# Patient Record
Sex: FEMALE | Race: BLACK OR AFRICAN AMERICAN | NOT HISPANIC OR LATINO | URBAN - METROPOLITAN AREA
[De-identification: names, ages, dates, MRNs, and addresses within clinical notes are randomized per-mention and may not be internally consistent; named-entity substitution may affect disease eponyms.]

---

## 2018-05-01 ENCOUNTER — EMERGENCY (EMERGENCY)
Facility: HOSPITAL | Age: 33
LOS: 1 days | Discharge: HOME | End: 2018-05-01
Attending: EMERGENCY MEDICINE | Admitting: EMERGENCY MEDICINE
Payer: COMMERCIAL

## 2018-05-01 VITALS
OXYGEN SATURATION: 97 % | TEMPERATURE: 98 F | WEIGHT: 227.08 LBS | DIASTOLIC BLOOD PRESSURE: 82 MMHG | SYSTOLIC BLOOD PRESSURE: 130 MMHG | HEIGHT: 67 IN | RESPIRATION RATE: 18 BRPM | HEART RATE: 85 BPM

## 2018-05-01 VITALS
DIASTOLIC BLOOD PRESSURE: 94 MMHG | OXYGEN SATURATION: 100 % | RESPIRATION RATE: 16 BRPM | HEART RATE: 89 BPM | TEMPERATURE: 99 F | SYSTOLIC BLOOD PRESSURE: 134 MMHG

## 2018-05-01 LAB
APPEARANCE UR: CLEAR — SIGNIFICANT CHANGE UP
BILIRUB UR-MCNC: NEGATIVE — SIGNIFICANT CHANGE UP
COLOR SPEC: YELLOW — SIGNIFICANT CHANGE UP
DIFF PNL FLD: ABNORMAL
EPI CELLS # UR: SIGNIFICANT CHANGE UP
GLUCOSE UR QL: NEGATIVE MG/DL — SIGNIFICANT CHANGE UP
HCG UR QL: NEGATIVE — SIGNIFICANT CHANGE UP
KETONES UR-MCNC: NEGATIVE — SIGNIFICANT CHANGE UP
LEUKOCYTE ESTERASE UR-ACNC: NEGATIVE — SIGNIFICANT CHANGE UP
NITRITE UR-MCNC: NEGATIVE — SIGNIFICANT CHANGE UP
PH UR: 5 — SIGNIFICANT CHANGE UP (ref 5–8)
PROT UR-MCNC: 15 MG/DL
RBC CASTS # UR COMP ASSIST: SIGNIFICANT CHANGE UP /HPF (ref 0–4)
SP GR SPEC: 1.02 — SIGNIFICANT CHANGE UP (ref 1.01–1.02)
UROBILINOGEN FLD QL: NEGATIVE MG/DL — SIGNIFICANT CHANGE UP
WBC UR QL: SIGNIFICANT CHANGE UP

## 2018-05-01 PROCEDURE — 99285 EMERGENCY DEPT VISIT HI MDM: CPT

## 2018-05-01 PROCEDURE — 74176 CT ABD & PELVIS W/O CONTRAST: CPT | Mod: 26

## 2018-05-01 RX ORDER — ACETAMINOPHEN 500 MG
650 TABLET ORAL ONCE
Refills: 0 | Status: COMPLETED | OUTPATIENT
Start: 2018-05-01 | End: 2018-05-01

## 2018-05-01 RX ORDER — IBUPROFEN 200 MG
600 TABLET ORAL ONCE
Refills: 0 | Status: COMPLETED | OUTPATIENT
Start: 2018-05-01 | End: 2018-05-01

## 2018-05-01 RX ORDER — METHOCARBAMOL 500 MG/1
2 TABLET, FILM COATED ORAL
Qty: 30 | Refills: 0
Start: 2018-05-01 | End: 2018-05-05

## 2018-05-01 RX ORDER — METHOCARBAMOL 500 MG/1
750 TABLET, FILM COATED ORAL ONCE
Refills: 0 | Status: COMPLETED | OUTPATIENT
Start: 2018-05-01 | End: 2018-05-01

## 2018-05-01 RX ADMIN — Medication 600 MILLIGRAM(S): at 09:48

## 2018-05-01 RX ADMIN — Medication 650 MILLIGRAM(S): at 09:48

## 2018-05-01 RX ADMIN — METHOCARBAMOL 750 MILLIGRAM(S): 500 TABLET, FILM COATED ORAL at 09:49

## 2018-05-01 NOTE — ED PROVIDER NOTE - OBJECTIVE STATEMENT
Dr. Odom Note: 32F with R flank pain for 1.5wks, no precipitating factors, worse with any movement of the area, non-pleuritic, no assoc urinary, GI, or respiratory sxs.  Constant, not relieved with Motrin.

## 2018-05-01 NOTE — ED ADULT NURSE REASSESSMENT NOTE - NS ED NURSE REASSESS COMMENT FT1
pt feels a little better pt re evaluated by md and to be d'c/d  pt discharged stable and ambulatory in nad at present d/c instruction reinforced and pt verbalized understanding vital signs as charted  pt upset because off today and tomorrow and needs note for Thursady and md explained that if not feeling better on Thursday needs to see pmd for re evaluation

## 2018-05-01 NOTE — ED PROVIDER NOTE - NS HIV RISK FACTOR YES
Declined Bilateral Helical Rim Advancement Flap Text: The defect edges were debeveled with a #15 blade scalpel.  Given the location of the defect and the proximity to free margins (helical rim) a bilateral helical rim advancement flap was deemed most appropriate.  Using a sterile surgical marker, the appropriate advancement flaps were drawn incorporating the defect and placing the expected incisions between the helical rim and antihelix where possible.  The area thus outlined was incised through and through with a #15 scalpel blade.  With a skin hook and iris scissors, the flaps were gently and sharply undermined and freed up.

## 2018-05-01 NOTE — ED PROVIDER NOTE - MEDICAL DECISION MAKING DETAILS
Dr. Odom Note: likely MSK, pt poor historian, consider other causes, not relieved with motrin, possible KS.

## 2018-05-01 NOTE — ED ADULT NURSE NOTE - CHPI ED SYMPTOMS NEG
no anorexia/no bladder dysfunction/no bowel dysfunction/no constipation/no fatigue/no neck tenderness/no numbness/no tingling/no difficulty bearing weight/no motor function loss

## 2018-05-01 NOTE — ED PROVIDER NOTE - PROGRESS NOTE DETAILS
Dr. Odom Note: slightly improved, reviewed ct results, explained need to f/u pcp, take laxatives, and return or see pcp within 2 days for re-eval of pain.  Pt demonstrates poor understanding, repeated and explained to patient few times and repeat back to demonstrate understanding, stable for dc, copy of results to pt. Dr. Odom Note: pt continues to want a work note for Thursday and Friday even though I explained to patient that she should f/u pcp and seek medical attention if pain persists beyond tomorrow.  Pt continue to ask for work note, want to force patient to get re-evaluation if pain is bad enough to miss work in 2 days.

## 2019-12-21 ENCOUNTER — EMERGENCY (EMERGENCY)
Facility: HOSPITAL | Age: 34
LOS: 1 days | Discharge: HOME | End: 2019-12-21
Attending: EMERGENCY MEDICINE | Admitting: EMERGENCY MEDICINE
Payer: COMMERCIAL

## 2019-12-21 VITALS
RESPIRATION RATE: 16 BRPM | TEMPERATURE: 98 F | SYSTOLIC BLOOD PRESSURE: 139 MMHG | DIASTOLIC BLOOD PRESSURE: 93 MMHG | OXYGEN SATURATION: 100 % | HEART RATE: 74 BPM

## 2019-12-21 VITALS
HEIGHT: 67 IN | DIASTOLIC BLOOD PRESSURE: 90 MMHG | HEART RATE: 97 BPM | RESPIRATION RATE: 15 BRPM | WEIGHT: 229.06 LBS | SYSTOLIC BLOOD PRESSURE: 137 MMHG | OXYGEN SATURATION: 100 % | TEMPERATURE: 98 F

## 2019-12-21 PROCEDURE — 99283 EMERGENCY DEPT VISIT LOW MDM: CPT

## 2019-12-21 PROCEDURE — 71046 X-RAY EXAM CHEST 2 VIEWS: CPT | Mod: 26

## 2019-12-21 RX ORDER — LIDOCAINE 4 G/100G
1 CREAM TOPICAL ONCE
Refills: 0 | Status: COMPLETED | OUTPATIENT
Start: 2019-12-21 | End: 2019-12-21

## 2019-12-21 RX ORDER — LIDOCAINE 4 G/100G
1 CREAM TOPICAL
Qty: 7 | Refills: 0
Start: 2019-12-21 | End: 2019-12-27

## 2019-12-21 RX ORDER — KETOROLAC TROMETHAMINE 30 MG/ML
30 SYRINGE (ML) INJECTION ONCE
Refills: 0 | Status: DISCONTINUED | OUTPATIENT
Start: 2019-12-21 | End: 2019-12-21

## 2019-12-21 RX ORDER — CYCLOBENZAPRINE HYDROCHLORIDE 10 MG/1
1 TABLET, FILM COATED ORAL
Qty: 20 | Refills: 0
Start: 2019-12-21 | End: 2019-12-27

## 2019-12-21 RX ORDER — CYCLOBENZAPRINE HYDROCHLORIDE 10 MG/1
10 TABLET, FILM COATED ORAL ONCE
Refills: 0 | Status: COMPLETED | OUTPATIENT
Start: 2019-12-21 | End: 2019-12-21

## 2019-12-21 RX ADMIN — Medication 30 MILLIGRAM(S): at 14:51

## 2019-12-21 RX ADMIN — CYCLOBENZAPRINE HYDROCHLORIDE 10 MILLIGRAM(S): 10 TABLET, FILM COATED ORAL at 14:51

## 2019-12-21 RX ADMIN — LIDOCAINE 1 PATCH: 4 CREAM TOPICAL at 14:50

## 2019-12-21 NOTE — ED PROVIDER NOTE - CLINICAL SUMMARY MEDICAL DECISION MAKING FREE TEXT BOX
left thoracic back pain since yesterday. no pain at rest, worse with movement. suspect muscular in nature. x-ray to eval for fx, pneumo, pneumonia. no red flags. no radicular symptoms. do not suspect renal colic or pyelonephritis. tx with toradol, flexeril, lidocaine patch, ortho referral

## 2019-12-21 NOTE — ED ADULT TRIAGE NOTE - CHIEF COMPLAINT QUOTE
patient has c/o back pain since yesterday, working in the hospital as CNA, denies any trauma on back. pain is positional,

## 2019-12-21 NOTE — ED PROVIDER NOTE - PATIENT PORTAL LINK FT
You can access the FollowMyHealth Patient Portal offered by [facility] by registering at the following website: [email or web address]. By joining [facility]’s FollowMyHealth portal, you will also be able to view your health information using other applications (apps) compatible with our system.

## 2019-12-21 NOTE — ED PROVIDER NOTE - NEUROLOGICAL, MLM
Alert and oriented, no focal deficits, no motor or sensory deficits. full ROM of all ext. neg SLR. no numbness in groin. no toe or foot drop

## 2019-12-21 NOTE — ED PROVIDER NOTE - NSFOLLOWUPINSTRUCTIONS_ED_ALL_ED_FT
rest, ice or moist heat. gentle stretching and gentle massage. naprosyn twice a day with food. flexeril for muscle spasm. lidocaine patches to area (12 hours on 12 hours off). if insurance does not cover patches, buy Salan[employer] lidocaine patches over the counter. follow up with orthopedics if pain continues. referral provided       Thoracic Strain     Thoracic strain is an injury to the muscles or tendons that attach to the upper back. A strain can be mild or severe. A mild strain may take only 1–2 weeks to heal. A severe strain involves torn muscles or tendons, so it may take 6–8 weeks to heal.  Follow these instructions at home:  Rest as needed. Limit your activity as told by your doctor.If directed, put ice on the injured area:  Put ice in a plastic bag.Place a towel between your skin and the bag.Leave the ice on for 20 minutes, 2–3 times per day.Take over-the-counter and prescription medicines only as told by your doctor.Begin doing exercises as told by your doctor or physical therapist.Warm up before being active.Bend your knees before you lift heavy objects.Keep all follow-up visits as told by your doctor. This is important.Contact a doctor if:  Your pain is not helped by medicine.Your pain, bruising, or swelling is getting worse.You have a fever.Get help right away if:  You have shortness of breath.You have chest pain.You have weakness or loss of feeling (numbness) in your legs.You cannot control when you pee (urinate).This information is not intended to replace advice given to you by your health care provider. Make sure you discuss any questions you have with your health care provider.

## 2019-12-21 NOTE — ED PROVIDER NOTE - MUSCULOSKELETAL, MLM
no tenderness reproduced to palpation. no vert tenderness or step off deformities. left thoracic back pain with flexion and rotation

## 2019-12-21 NOTE — ED PROVIDER NOTE - CARE PROVIDER_API CALL
Julio Cesar Fitzgerald)  Orthopaedic Surgery  [address] [place] [address]  Phone: [phone]  Fax: [phone]  Follow Up Time:     Nika Plaza)  Internal Medicine  [address] [place] [address]  Phone: [phone]  Fax: [phone]  Follow Up Time:

## 2019-12-21 NOTE — ED PROVIDER NOTE - OBJECTIVE STATEMENT
34 year old female with history of HTN presents with left thoracic back pain since yesterday morning. noticed it when she woke up yesterday morning, has pain with movement. no pain at rest, increases with movement. no chest pain or abd pain. no n/v. no radicular pain. no loss of bowel or bladder control. no weakness in ext. took advil last night without much improvement of pain. no urinary symptoms   no current PCP

## 2019-12-21 NOTE — ED PROVIDER NOTE - PROGRESS NOTE DETAILS
Trevor Benavides for attending Dr. Nguyen: 35 y/o female with a PMHx of htn and pre-eclampsia, presents to the ED c/o left upper back pain occasionally radiating to abd. Yesterday, woke up and suddenly felt sx upon moving in bed. Denies fevers, chills, cough, chest pain, SOB, weakness, abd pain, spasm, or numbness. Sx is exacerbated with movement. Taken Motrin with no relief. Nonsmoker. Does not have a PCP. No other complaints at this time. Physical exam: Well developed, well nourished, and in no acute distress. Moist mucous membranes. S1, S2 Regular rate and rhythm. Lungs CTA. Abd nontender, nondistended. No CVAT. Neck and back has no midline tenderness. No midline tenderness. Left upper back is nontender, but +pain with passive ROM. Extremities non-tender full ROM. I Rachael Beanvides attest that this documentation has been prepared under the direction and in the presence of Doctor Nguyen. Trevor Benavides for attending Dr. Nguyen: 33 y/o female with a PMHx of htn and pre-eclampsia, presents to the ED c/o left upper back pain occasionally radiating to abd. Yesterday, woke up and suddenly felt sx upon moving/twisting in bed. Denies fevers, chills, cough, chest pain, SOB, weakness, weakness or numbness, incontinence, urinary comaplaints. Sx is exacerbated with movement. Taken Motrin with no relief. Nonsmoker. Does not have a PCP. No other complaints at this time. Physical exam: Well developed, well nourished, and in no acute distress. Moist mucous membranes. S1, S2 Regular rate and rhythm. Lungs CTA. Abd nontender, nondistended. No CVAT. Neck and back has no midline tenderness. No midline tenderness. Left upper back is nontender, but +reproducible pain with passive ROM. Extremities non-tender full ROM, distal n/v intact, no foot drop Reevaluated patient at bedside.  Patient feeling improved.  ambulatory with steady gait. soft tissue instructions explained. stretches explained and demonstrated. referral to ortho provided.   Discussed the results of all diagnostic testing in ED and copies of all reports given.   An opportunity to ask questions was given.  Discussed the importance of prompt, close medical follow-up.  Patient will return with any changes, concerns or persistent / worsening symptoms.  Understanding of all instructions verbalized.

## 2019-12-21 NOTE — ED ADULT NURSE NOTE - OBJECTIVE STATEMENT
patient has been holding heavy bag for her study and c/o back pain since yesterday, pain is positional and goes up to 10/10, motrin didn't help her last night, denies trauma, no difficulty breathing, pending MD's eval, no PMH, will continue to monitor.

## 2020-03-31 PROBLEM — Z00.00 ENCOUNTER FOR PREVENTIVE HEALTH EXAMINATION: Noted: 2020-03-31

## 2020-11-10 NOTE — ED ADULT TRIAGE NOTE - STATUS:
Emergency Department Resident Note    Patient: Anju Pugh Age: 69 year old Sex: female   MRN: [id number] : 1951 Encounter Date: 2020       HISTORY OF PRESENT ILLNESS  This is a 69 year old female previous TIA presenting for evaluation of disequilibrium worsening over the past month.  She states intermittently she feels as if she is losing her balance and has to grab the wall to help her walk.  She states that these episodes come randomly but have worsened over the past month.  She talked to her doctor about this who prompted her to come in for TIA work-up.  She denies any focal numbness or tingling, focal weakness, headaches, blurry visions.  She states when she had her previous TIA she had aphasia which resolved.  She denies any recent falls.  She denies any fevers, dysuria.    REVIEW OF SYSTEMS  Review of Systems   Constitutional: Negative for fever.   HENT: Negative for congestion.    Respiratory: Negative for shortness of breath.    Cardiovascular: Negative for chest pain.   Gastrointestinal: Negative for abdominal pain, nausea and vomiting.   Endocrine: Negative for polyuria.   Genitourinary: Negative for dysuria.   Musculoskeletal: Negative for myalgias.   Skin: Negative for rash.   Neurological: Positive for dizziness.   Hematological: Does not bruise/bleed easily.   Psychiatric/Behavioral: Negative for suicidal ideas.               PAST HISTORY         Past Medical History:   Diagnosis Date   • TIA (transient ischemic attack)     Past Surgical History:   Procedure Laterality Date   • HYSTERECTOMY               Social History     Tobacco Use   • Smoking status: Former Smoker     Packs/day: 1.50     Years: 30.00     Pack years: 45.00   • Smokeless tobacco: Never Used   Substance Use Topics   • Alcohol use: Not Currently   • Drug use: Not Currently    Family History   Problem Relation Age of Onset   • Patient is unaware of any medical problems Mother    • Patient is unaware of any medical  problems Father              Prior to Admission medications    Medication Sig Start Date End Date Taking? Authorizing Provider   clobetasol (TEMOVATE) 0.05 % ointment Apply topically every 12 hours. 9/10/20   Arvind Evans MD   metroNIDAZOLE (METROCREAM) 0.75 % cream Apply on skin, twice daily as needed for rosacea, 9/10/20   Arvind Evans MD   atorvastatin (LIPITOR) 20 MG tablet Take 1 tablet by mouth daily. 9/10/20   Arvind Evans MD   aspirin 81 MG EC tablet Take 1 tablet by mouth daily. 12/5/19   Arvind Evans MD    Allergies   Allergen Reactions   • Orange RASH           Additional Information:     PHYSICAL EXAMINATION  ED Triage Vitals [11/09/20 2035]   ED Triage Vitals Group      Temp 96.5 °F (35.8 °C)      Heart Rate 76      Resp 18      /81      SpO2 98 %      EtCO2 mmHg       Height       Weight 155 lb 6.8 oz (70.5 kg)      Weight Scale Used       BMI (Calculated)       IBW/kg (Calculated)      Physical Exam  Vitals signs and nursing note reviewed.   Constitutional:       Appearance: She is not toxic-appearing.   HENT:      Head: Normocephalic and atraumatic.      Mouth/Throat:      Mouth: Mucous membranes are moist.   Eyes:      Extraocular Movements: Extraocular movements intact.   Neck:      Musculoskeletal: Neck supple.   Cardiovascular:      Rate and Rhythm: Regular rhythm. Bradycardia present.      Heart sounds: Normal heart sounds.   Pulmonary:      Effort: Pulmonary effort is normal.      Breath sounds: Normal breath sounds.   Abdominal:      General: Abdomen is flat. There is no distension.      Palpations: Abdomen is soft.   Musculoskeletal: Normal range of motion.   Skin:     General: Skin is warm and dry.   Neurological:      General: No focal deficit present.      Mental Status: She is alert. Mental status is at baseline.      GCS: GCS eye subscore is 4. GCS verbal subscore is 5. GCS motor subscore is 6.      Cranial Nerves: No cranial nerve deficit.      Sensory: No sensory  deficit.      Coordination: Coordination normal.   Psychiatric:         Mood and Affect: Mood normal.         Labs:   Results for orders placed or performed during the hospital encounter of 11/10/20   CBC with Automated Differential   Result Value Ref Range    WBC 9.8 4.2 - 11.0 K/mcL    RBC 4.66 4.00 - 5.20 mil/mcL    HGB 13.2 12.0 - 15.5 g/dL    HCT 41.1 36.0 - 46.5 %    MCV 88.2 78.0 - 100.0 fl    MCH 28.3 26.0 - 34.0 pg    MCHC 32.1 32.0 - 36.5 g/dL    RDW-CV 13.4 11.0 - 15.0 %     140 - 450 K/mcL    NRBC 0 0 /100 WBC    DIFF TYPE AUTOMATED DIFFERENTIAL     Neutrophil 63 %    LYMPH 26 %    MONO 7 %    EOSIN 3 %    BASO 1 %    Percent Immature Granuloctyes 0 %    Absolute Neutrophil 6.1 1.8 - 7.7 K/mcL    Absolute Lymph 2.6 1.0 - 4.0 K/mcL    Absolute Mono 0.7 0.3 - 0.9 K/mcL    Absolute Eos 0.3 0.1 - 0.5 K/mcL    Absolute Baso 0.1 0.0 - 0.3 K/mcL    Absolute Immature Granulocytes 0.0 0 - 0.2 K/mcl   Comprehensive Metabolic Panel   Result Value Ref Range    Sodium 141 135 - 145 mmol/L    Potassium 4.2 3.4 - 5.1 mmol/L    Chloride 110 (H) 98 - 107 mmol/L    Carbon Dioxide 26 21 - 32 mmol/L    Anion Gap 9 (L) 10 - 20 mmol/L    Glucose 93 65 - 99 mg/dL    BUN 29 (H) 6 - 20 mg/dL    Creatinine 0.91 0.51 - 0.95 mg/dL    GFR Estimate,  75     GFR Estimate, Non African American 65     BUN/Creatinine Ratio 32 (H) 7 - 25    CALCIUM 10.0 8.4 - 10.2 mg/dL    TOTAL BILIRUBIN 0.3 0.2 - 1.0 mg/dL    AST/SGOT 14 <38 Units/L    ALT/SGPT 21 <64 Units/L    ALK PHOSPHATASE 82 45 - 117 Units/L    TOTAL PROTEIN 8.0 6.4 - 8.2 g/dL    Albumin 3.7 3.6 - 5.1 g/dL    GLOBULIN 4.3 (H) 2.0 - 4.0 g/dL    A/G Ratio, Serum 0.9 (L) 1.0 - 2.4   Troponin I Ultra Sensitive   Result Value Ref Range    TROPONIN I <0.02 <0.05 ng/mL   Rapid SARS-CoV-2 by PCR    Specimen: Nasopharyngeal; Swab   Result Value Ref Range    Source, 2019 Novel Coronavirus (SARS-CoV-2) Nasopharyngeal     Rapid SARS-COV-2 by PCR NOT DETECTED NOT  DETECTED    Isolation Guidelines         Imaging:   CT HEAD WO CONTRAST   Final Result   No acute intracranial abnormality.       Electronically Signed by: JAD RAMOS M.D.   Signed on: 11/10/2020 05:12 AM      XR CHEST PA AND LATERAL 2 VIEWS   Final Result   No radiographic evidence of acute cardiopulmonary process.       Electronically Signed by: WILEY NEWMAN MD    Signed on: 11/9/2020 9:28 PM            EKG: EKG reviewed and showed Normal sinus rhythm, no ST segment elevations or depressions, T wave flattening in aVL      MEDICAL DECISION MAKING  This is a 69 year old female with history of TIA presenting for evaluation of intermittent disequilibrium over the past month.  On exam, patient is nontoxic-appearing, hemodynamically stable, no focal neurologic deficits noted.  Patient's presentation concerning for TIAs.  She also does appear bradycardic intermittently on her exam and she could be going into abnormal rhythms.  Her labs are unremarkable here.  CT head unremarkable.  We will give her aspirin for possible TIA.  Patient will be placed in observation for further TIA work-up.  Patient is agreeable to this plan.      IMPRESSION AND PLAN  ED Diagnosis   1. Disequilibrium       Disposition: Observation    Hermila Tinajero  Emergency Medicine, PGY3  [employer]:          Hermila Tinajero  Resident  11/10/20 0863     [employer]

## 2021-01-29 ENCOUNTER — EMERGENCY (EMERGENCY)
Facility: HOSPITAL | Age: 36
LOS: 1 days | Discharge: HOME | End: 2021-01-29
Attending: EMERGENCY MEDICINE | Admitting: EMERGENCY MEDICINE
Payer: COMMERCIAL

## 2021-01-29 VITALS
OXYGEN SATURATION: 100 % | DIASTOLIC BLOOD PRESSURE: 93 MMHG | HEART RATE: 70 BPM | TEMPERATURE: 98 F | SYSTOLIC BLOOD PRESSURE: 143 MMHG

## 2021-01-29 VITALS
OXYGEN SATURATION: 100 % | RESPIRATION RATE: 16 BRPM | HEIGHT: 67 IN | SYSTOLIC BLOOD PRESSURE: 144 MMHG | HEART RATE: 76 BPM | TEMPERATURE: 98 F | WEIGHT: 225.09 LBS | DIASTOLIC BLOOD PRESSURE: 94 MMHG

## 2021-01-29 LAB — HCG UR QL: NEGATIVE — SIGNIFICANT CHANGE UP

## 2021-01-29 PROCEDURE — G1004: CPT

## 2021-01-29 PROCEDURE — 70486 CT MAXILLOFACIAL W/O DYE: CPT | Mod: 26

## 2021-01-29 PROCEDURE — 70450 CT HEAD/BRAIN W/O DYE: CPT | Mod: 26

## 2021-01-29 PROCEDURE — 99285 EMERGENCY DEPT VISIT HI MDM: CPT

## 2021-01-29 RX ORDER — OXYCODONE AND ACETAMINOPHEN 5; 325 MG/1; MG/1
1 TABLET ORAL
Qty: 6 | Refills: 0
Start: 2021-01-29

## 2021-01-29 NOTE — ED PROVIDER NOTE - CARE PROVIDER_API CALL
Corie Hanson (DO)  Internal Medicine  [address] [place] [address]  Phone: [phone]  Fax: [phone]  Follow Up Time:

## 2021-01-29 NOTE — ED ADULT NURSE NOTE - NSFALLRSKHARMRISK_ED_ALL_ED
Problem: Patient Care Overview  Goal: Plan of Care/Patient Progress Review  OT/PT: Chart reviewed, discussed with nursing, pt continues to not be medically appropriate at this time for skilled therapy. Will reschedule.        no

## 2021-01-29 NOTE — ED PROVIDER NOTE - NSFOLLOWUPINSTRUCTIONS_ED_ALL_ED_FT
1) Follow-up with your Primary Medical Doctor or referred doctor. Call today / next business day for prompt follow-up.  2) Return to Emergency room for any worsening or persistent pain, dizziness, difficulty walking, feeling unsteady, vomiting, visual changes, numbness, tingling, any unknown fluid coming out of nose or ears, any changes in your speech,  worsening or persistent headaches,  weakness, fever, or any other concerning symptoms.  3) See attached instruction sheets for additional information, including information regarding signs and symptoms to look out for, reasons to seek immediate care and other important instructions.  4) You should avoid any activities where you may hit your head until cleared by your doctor, at least two weeks.  5) Follow-up with Neurology, especially if your symptoms persist.  Dr Sugar Reyna: [phone]  [facility]): [phone]  [facility]: [phone]  6) Tylenol as needed for pain

## 2021-01-29 NOTE — ED PROVIDER NOTE - PROGRESS NOTE DETAILS
At length discussion with patient in regards to the head inj.  Discussed importance of prompt, close medical follow-up.  Discussed importance of avoiding activities where the patient would be at risk for further head injury, for a minimum of 2 weeks.  Discussed head injury precautions and instructions as well.  Patient demonstrates understanding of all instructions. Pt req percocet for pain - dw pt re percocet prec/ inst and no driving

## 2021-01-29 NOTE — ED PROVIDER NOTE - PATIENT PORTAL LINK FT
You can access the FollowMyHealth Patient Portal offered by [facility] by registering at the following website: [email or web address]. By joining [employer]’s FollowMyHealth portal, you will also be able to view your health information using other applications (apps) compatible with our system.

## 2021-01-29 NOTE — ED PROVIDER NOTE - OBJECTIVE STATEMENT
34 yo F p/w slipped on stairs and slid down last night. Pt hit R side of face, ? L side as well. pt co facial pain. no loc. Min HA. No n/v/dizzy. No neck / back pain. no ext pain or inj. No laceration / bleeding. no weakness / dizziness. no fever/chills. NO agg/allev factors. No other inj or co. No known covid exposure / prior infxn. no agg/allev factors. No other inj or co.

## 2021-01-29 NOTE — ED PROVIDER NOTE - ENMT, MLM
Airway patent, Nasal mucosa clear. Mouth with normal mucosa. Throat has no vesicles, no oropharyngeal exudates and uvula is midline. pos R maxillary swelling / ecchymosis with mild tend. no crepitus. Min tend to L maxilla as well - no edema / ecchymosis. no dc from ears.

## 2021-08-12 ENCOUNTER — EMERGENCY (EMERGENCY)
Facility: HOSPITAL | Age: 36
LOS: 1 days | Discharge: HOME | End: 2021-08-12
Attending: EMERGENCY MEDICINE | Admitting: EMERGENCY MEDICINE
Payer: COMMERCIAL

## 2021-08-12 VITALS
DIASTOLIC BLOOD PRESSURE: 86 MMHG | HEART RATE: 80 BPM | TEMPERATURE: 99 F | RESPIRATION RATE: 16 BRPM | OXYGEN SATURATION: 100 % | HEIGHT: 67 IN | WEIGHT: 223.11 LBS | SYSTOLIC BLOOD PRESSURE: 135 MMHG

## 2021-08-12 VITALS
DIASTOLIC BLOOD PRESSURE: 88 MMHG | SYSTOLIC BLOOD PRESSURE: 135 MMHG | RESPIRATION RATE: 14 BRPM | TEMPERATURE: 98 F | OXYGEN SATURATION: 98 % | HEART RATE: 78 BPM

## 2021-08-12 LAB
ALBUMIN SERPL ELPH-MCNC: 3.6 G/DL — SIGNIFICANT CHANGE UP (ref 3.3–5)
ALP SERPL-CCNC: 75 U/L — SIGNIFICANT CHANGE UP (ref 30–120)
ALT FLD-CCNC: 21 U/L DA — SIGNIFICANT CHANGE UP (ref 10–60)
ANION GAP SERPL CALC-SCNC: 6 MMOL/L — SIGNIFICANT CHANGE UP (ref 5–17)
AST SERPL-CCNC: 17 U/L — SIGNIFICANT CHANGE UP (ref 10–40)
BASOPHILS # BLD AUTO: 0.03 K/UL — SIGNIFICANT CHANGE UP (ref 0–0.2)
BASOPHILS NFR BLD AUTO: 0.5 % — SIGNIFICANT CHANGE UP (ref 0–2)
BILIRUB SERPL-MCNC: 0.1 MG/DL — LOW (ref 0.2–1.2)
BUN SERPL-MCNC: 9 MG/DL — SIGNIFICANT CHANGE UP (ref 7–23)
CALCIUM SERPL-MCNC: 8.9 MG/DL — SIGNIFICANT CHANGE UP (ref 8.4–10.5)
CHLORIDE SERPL-SCNC: 104 MMOL/L — SIGNIFICANT CHANGE UP (ref 96–108)
CO2 SERPL-SCNC: 27 MMOL/L — SIGNIFICANT CHANGE UP (ref 22–31)
CREAT SERPL-MCNC: 0.51 MG/DL — SIGNIFICANT CHANGE UP (ref 0.5–1.3)
D DIMER BLD IA.RAPID-MCNC: <150 NG/ML DDU — SIGNIFICANT CHANGE UP
EOSINOPHIL # BLD AUTO: 0.13 K/UL — SIGNIFICANT CHANGE UP (ref 0–0.5)
EOSINOPHIL NFR BLD AUTO: 2.2 % — SIGNIFICANT CHANGE UP (ref 0–6)
GLUCOSE SERPL-MCNC: 99 MG/DL — SIGNIFICANT CHANGE UP (ref 70–99)
HCG UR QL: NEGATIVE — SIGNIFICANT CHANGE UP
HCT VFR BLD CALC: 40.5 % — SIGNIFICANT CHANGE UP (ref 34.5–45)
HGB BLD-MCNC: 12.9 G/DL — SIGNIFICANT CHANGE UP (ref 11.5–15.5)
IMM GRANULOCYTES NFR BLD AUTO: 0.3 % — SIGNIFICANT CHANGE UP (ref 0–1.5)
LYMPHOCYTES # BLD AUTO: 2.38 K/UL — SIGNIFICANT CHANGE UP (ref 1–3.3)
LYMPHOCYTES # BLD AUTO: 40.1 % — SIGNIFICANT CHANGE UP (ref 13–44)
MAGNESIUM SERPL-MCNC: 2 MG/DL — SIGNIFICANT CHANGE UP (ref 1.6–2.6)
MCHC RBC-ENTMCNC: 25.3 PG — LOW (ref 27–34)
MCHC RBC-ENTMCNC: 31.9 GM/DL — LOW (ref 32–36)
MCV RBC AUTO: 79.4 FL — LOW (ref 80–100)
MONOCYTES # BLD AUTO: 0.59 K/UL — SIGNIFICANT CHANGE UP (ref 0–0.9)
MONOCYTES NFR BLD AUTO: 9.9 % — SIGNIFICANT CHANGE UP (ref 2–14)
NEUTROPHILS # BLD AUTO: 2.78 K/UL — SIGNIFICANT CHANGE UP (ref 1.8–7.4)
NEUTROPHILS NFR BLD AUTO: 47 % — SIGNIFICANT CHANGE UP (ref 43–77)
NRBC # BLD: 0 /100 WBCS — SIGNIFICANT CHANGE UP (ref 0–0)
PHOSPHATE SERPL-MCNC: 3.3 MG/DL — SIGNIFICANT CHANGE UP (ref 2.5–4.5)
PLATELET # BLD AUTO: 320 K/UL — SIGNIFICANT CHANGE UP (ref 150–400)
POTASSIUM SERPL-MCNC: 3.8 MMOL/L — SIGNIFICANT CHANGE UP (ref 3.5–5.3)
POTASSIUM SERPL-SCNC: 3.8 MMOL/L — SIGNIFICANT CHANGE UP (ref 3.5–5.3)
PROT SERPL-MCNC: 7.3 G/DL — SIGNIFICANT CHANGE UP (ref 6–8.3)
RBC # BLD: 5.1 M/UL — SIGNIFICANT CHANGE UP (ref 3.8–5.2)
RBC # FLD: 13.3 % — SIGNIFICANT CHANGE UP (ref 10.3–14.5)
SODIUM SERPL-SCNC: 137 MMOL/L — SIGNIFICANT CHANGE UP (ref 135–145)
WBC # BLD: 5.93 K/UL — SIGNIFICANT CHANGE UP (ref 3.8–10.5)
WBC # FLD AUTO: 5.93 K/UL — SIGNIFICANT CHANGE UP (ref 3.8–10.5)

## 2021-08-12 PROCEDURE — 99285 EMERGENCY DEPT VISIT HI MDM: CPT

## 2021-08-12 PROCEDURE — 70450 CT HEAD/BRAIN W/O DYE: CPT | Mod: 26,MA

## 2021-08-12 RX ORDER — SODIUM CHLORIDE 9 MG/ML
1000 INJECTION INTRAMUSCULAR; INTRAVENOUS; SUBCUTANEOUS ONCE
Refills: 0 | Status: COMPLETED | OUTPATIENT
Start: 2021-08-12 | End: 2021-08-12

## 2021-08-12 RX ADMIN — SODIUM CHLORIDE 1000 MILLILITER(S): 9 INJECTION INTRAMUSCULAR; INTRAVENOUS; SUBCUTANEOUS at 10:30

## 2021-08-12 NOTE — ED PROVIDER NOTE - MUSCULOSKELETAL, MLM
Spine appears normal, range of motion is not limited, no muscle or joint tenderness, calf L NT without swelling, toes warm & mobile, pulses and sensation intact, neg homanns sign, NVI

## 2021-08-12 NOTE — ED ADULT TRIAGE NOTE - CHIEF COMPLAINT QUOTE
" I have a discomfort that starts in my left lower leg, goes to my arm, head and left arm since yesterday "

## 2021-08-12 NOTE — ED ADULT NURSE NOTE - NSIMPLEMENTINTERV_GEN_ALL_ED
Implemented All Universal Safety Interventions:  [place] to call system. Call bell, personal items and telephone within reach. Instruct patient to call for assistance. Room bathroom lighting operational. Non-slip footwear when patient is off stretcher. Physically safe environment: no spills, clutter or unnecessary equipment. Stretcher in lowest position, wheels locked, appropriate side rails in place.

## 2021-08-12 NOTE — ED ADULT NURSE NOTE - OBJECTIVE STATEMENT
pt presented with c/o tingling on left leg and right arm, reports tingling started on Tuesday on left leg and radiated to left side of body to head and to right arm.

## 2021-08-12 NOTE — ED PROVIDER NOTE - NSFOLLOWUPINSTRUCTIONS_ED_ALL_ED_FT
Follow up with your PMD in 1-2 days for re-evaluation, ongoing care and treatment. Rest, drink plenty of fluids. If having worsening of symptoms or other related symptoms, RETURN TO THE ER IMMEDIATELY.     Paresthesia      Paresthesia is a burning or prickling feeling. This feeling can happen in any part of the body. It often happens in the hands, arms, legs, or feet. Usually, it is not painful. In most cases, the feeling goes away in a short time and is not a sign of a serious problem. If you have paresthesia that lasts a long time, you may need to be seen by your doctor.      Follow these instructions at home:      Alcohol use    • Do not drink alcohol if:  •Your doctor tells you not to drink.       •You are pregnant, may be pregnant, or are planning to become pregnant.      •If you drink alcohol: •Limit how much you use to:  •0–1 drink a day for women.      •0–2 drinks a day for men.        •Be aware of how much alcohol is in your drink. In the [place] one drink equals one 12 oz bottle of beer (355 mL), one 5 oz glass of wine (148 mL), or one 1½ oz glass of hard liquor (44 mL).          Nutrition    •Eat a healthy diet. This includes:  •Eating foods that have a lot of fiber in them, such as fresh fruits and vegetables, whole grains, and beans.      •Limiting foods that have a lot of fat and processed sugars in them, such as fried or sweet foods.        General instructions     •Take over-the-counter and prescription medicines only as told by your doctor.      • Do not use any products that have nicotine or tobacco in them, such as cigarettes and e-cigarettes. If you need help quitting, ask your doctor.      •If you have diabetes, work with your doctor to make sure your blood sugar stays in a healthy range.    •If your feet feel numb:  •Check for redness, warmth, and swelling every day.      •Wear padded socks and comfortable shoes. These help protect your feet.        •Keep all follow-up visits as told by your doctor. This is important.        Contact a doctor if:    •You have paresthesia that gets worse or does not go away.      •Your burning or prickling feeling gets worse when you walk.      •You have pain or cramps.      •You feel dizzy.      •You have a rash.        Get help right away if you:    •Feel weak.      •Have trouble walking or moving.      •Have problems speaking, understanding, or seeing.      •Feel confused.      •Cannot control when you pee (urinate) or poop (have a bowel movement).      •Lose feeling (have numbness) after an injury.      •Have new weakness in an arm or leg.      •Pass out (faint).        Summary    •Paresthesia is a burning or prickling feeling. It often happens in the hands, arms, legs, or feet.      •In most cases, the feeling goes away in a short time and is not a sign of a serious problem.      •If you have paresthesia that lasts a long time, you may need to be seen by your doctor.      This information is not intended to replace advice given to you by your health care provider. Make sure you discuss any questions you have with your health care provider.

## 2021-08-12 NOTE — ED PROVIDER NOTE - PATIENT PORTAL LINK FT
You can access the FollowMyHealth Patient Portal offered by [facility] by registering at the following website: [email or web address]. By joining [employer]s FollowMyHealth portal, you will also be able to view your health information using other applications (apps) compatible with our system.

## 2021-08-12 NOTE — ED PROVIDER NOTE - CPE EDP EYES NORM
Martha Rodgers was admitted to  from ED via cart accompanied by ED tech.   Reason for hospitalization is chest pain/palpitations.   Upon arrival, patient is stable. 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 Sneed's esophagus. EGD in 3 years for Sneed's follow-up.   • Blood clot associated with vein wall inflammation    • Chronic headaches    • Depression    • Fibromyalgia    • Interstitial lung disease (CMS/HCC)    • Lumbar disc disease    • RA (rheumatoid arthritis) (CMS/HCC)    • RAD (reactive airway disease)    • s/p VATS right lower lobe and right middle lobe wedge resections 5/2/2016   • Sinusitis, chronic    • Systemic sclerosis (CMS/HCC) 9/22/2014   • Urinary tract infection    • Wrist fracture, bilateral      Past Surgical History:   Procedure Laterality Date   • ACHILLES TENDON SURGERY  2007   • CARPAL TUNNEL RELEASE  2005   • CERVICAL CERCLAGE  1998   • CHOLECYSTECTOMY  10/2006   • ESOPHAGOGASTRODUODENOSCOPY  09/11/2017    Dr Dudley   • LUNG BIOPSY     • SINUS SURGERY     • STOMACH SURGERY      stomach stapling   • TONSILLECTOMY AND ADENOIDECTOMY     • TUBAL LIGATION  1999   Patient oriented to bed, call light, , room and unit.  Patient provided with the following educational materials upon admission:safety, advanced directives, infection control and pain.   Level of understanding patient verbalized understanding.   Admission orders not received at this time.   MD notified of patient arrival.   See Epic documentation for patient individualized nursing care plan.   normal...

## 2021-08-12 NOTE — ED PROVIDER NOTE - PROGRESS NOTE DETAILS
Reevaluated patient at bedside.  Patient feeling much improved.  Discussed the results of all diagnostic testing in ED and copies of all reports given. Advised to monitor BP at home, f/u pcp for re-eval and treatment.  An opportunity to ask questions was given.  Discussed the importance of prompt, close medical follow-up.  Patient will return with any changes, concerns or persistent / worsening symptoms.  Understanding of all instructions verbalized.

## 2021-08-12 NOTE — ED PROVIDER NOTE - CLINICAL SUMMARY MEDICAL DECISION MAKING FREE TEXT BOX
34 y/o F with hx of preeclampsia presents with c/o diffuse paresthesias x 2 days. States that she has had intermittent paresthesias in her left leg, left shoulder and right arm since Tuesday. States that she has not seen her PCP in [place] in 2 years. Pt is not on any antihypertensives. Denies pregnancy smoking, ocp use, recent immobilization, fever, N/V/D, CP, SOB, headache, dizziness, vision changes, weakness, abdominal pain, calf pain/swelling or other symptoms. PE: as above; will check labs, ct head, IVF, ddimer, reassess

## 2021-08-12 NOTE — ED PROVIDER NOTE - ATTENDING CONTRIBUTION TO CARE
35 f with hx of preeclampsia co paraesthesias in left lower leg rt arm and left shoulder for 2 days. Denies fever, headache, weakness, visual disturbance or other symptom. Is worried she may have a blood clot. Denies calf tenderness or swelling. Nonsmoker, not pregnant, no BCP. Had covid infection last year and is unvaccinated.  PCP in [place], has not seen in 2 years.    PE /80, Afebrile, NAD  HEENT Clear, PERRL, EOMI,  Neck supple nontender, Lungs clear, Cor S1S2 RR -MGR  Abd soft nontender, no mass or HSM  Ext FROM, pulses sensation intact. No calf swelling or tenderness, no cords or Homans.   Skin Warm and dry no rash.  Neuro No deficits, CN 2 - 12 intact. Sensation and motor strength 5/5 thruout.  Gait normal.      Imp Paraesthesias  Plan - Labs, CT brain. DDimer.     I performed a history and physical exam of the patient and discussed their management with the advanced care provider. I reviewed the advanced care provider's note and agree with the documented findings and plan of care. My medical decision making and objective findings are found above.

## 2021-08-12 NOTE — ED PROVIDER NOTE - OBJECTIVE STATEMENT
34 y/o F with hx of preeclampsia presents with c/o diffuse paresthesias x 2 days. States that she has had intermittent paresthesias in her left leg, left shoulder and right arm since Tuesday. States that she has not seen her PCP in [place] in 2 years. Pt is not on any antihypertensives. Denies pregnancy smoking, ocp use, recent immobilization, fever, N/V/D, CP, SOB, headache, dizziness, vision changes, weakness, abdominal pain, calf pain/swelling or other symptoms.

## 2021-08-12 NOTE — ED PROVIDER NOTE - CHIEF COMPLAINT
The patient is a 35y Female complaining of pain, lower leg. The patient is a 35y Female complaining of pain.

## 2021-10-27 ENCOUNTER — EMERGENCY (EMERGENCY)
Facility: HOSPITAL | Age: 36
LOS: 1 days | Discharge: HOME | End: 2021-10-27
Attending: EMERGENCY MEDICINE | Admitting: EMERGENCY MEDICINE
Payer: SELF-PAY

## 2021-10-27 VITALS
WEIGHT: 220.46 LBS | HEART RATE: 90 BPM | HEIGHT: 67 IN | DIASTOLIC BLOOD PRESSURE: 81 MMHG | RESPIRATION RATE: 16 BRPM | TEMPERATURE: 99 F | SYSTOLIC BLOOD PRESSURE: 138 MMHG | OXYGEN SATURATION: 99 %

## 2021-10-27 PROCEDURE — 99284 EMERGENCY DEPT VISIT MOD MDM: CPT

## 2021-10-27 NOTE — ED PROVIDER NOTE - PROVIDER TOKENS
FREE:[LAST:[Your doctor],PHONE:[(   )    -],FAX:[(   )    -],FOLLOWUP:[4-6 Days]],PROVIDER:[TOKEN:[[id number]]]

## 2021-10-27 NOTE — ED PROVIDER NOTE - PATIENT PORTAL LINK FT
You can access the FollowMyHealth Patient Portal offered by [facility] by registering at the following website: [email or web address]. By joining [employer]’s FollowMyHealth portal, you will also be able to view your health information using other applications (apps) compatible with our system.

## 2021-10-27 NOTE — ED PROVIDER NOTE - CLINICAL SUMMARY MEDICAL DECISION MAKING FREE TEXT BOX
Patient with elevated BP readings, normal now. Varying neurologic symptoms. Recent normal CT. Will get labs to r/o metabolic issues and refer to neuro for f/u

## 2021-10-27 NOTE — ED PROVIDER NOTE - CARE PLAN
1 Principal Discharge DX:	Paresthesia   Principal Discharge DX:	Paresthesia  Secondary Diagnosis:	Elevated BP without diagnosis of hypertension

## 2021-10-27 NOTE — ED PROVIDER NOTE - OBJECTIVE STATEMENT
Patient states she has had some elevated readings of her BP since she had preeclampsia in 2019. Was here 2 months ago with weird feeling in her left leg and diagnosed with paresthesias. Had CT at that time which was normal Patient states she has had some elevated readings of her BP since she had preeclampsia in 2019. Was here 2 months ago with weird feeling in her left leg and diagnosed with paresthesias. Had CT at that time which was normal. Patient has continued to have some elevated readings. Today, BP was higher than ever before (about 170/106). Patient took a family member's amlodipine in the morning. BOP improved, but patient feels a weird feeling in her right arm, and a h/a. States she gets a lot of clots with her menses (which she has currently) and is concerned that means she is prone to clots

## 2021-10-27 NOTE — ED ADULT NURSE NOTE - OBJECTIVE STATEMENT
Pt presents to the ED with reports of fluctuating BP and a high reading last night. Pt also states that she has a discomfort in her right arm that she states kept her from sleeping and feels "heavy". Pt is awake and alert, denies any chest pain or difficulty breathing. Pt ambulating with a steady gait.

## 2021-10-27 NOTE — ED PROVIDER NOTE - CARE PROVIDER_API CALL
Your doctor,   Phone: (   )    -  Fax: (   )    -  Follow Up Time: 4-6 Days    Jose Luis Almanzar; MBBS)  Internal Medicine  [address] [place] [address]  Phone: [phone]  Fax: [phone]  Follow Up Time:

## 2021-10-28 VITALS
RESPIRATION RATE: 16 BRPM | HEART RATE: 67 BPM | SYSTOLIC BLOOD PRESSURE: 131 MMHG | OXYGEN SATURATION: 100 % | DIASTOLIC BLOOD PRESSURE: 83 MMHG

## 2021-10-28 LAB
ALBUMIN SERPL ELPH-MCNC: 3.5 G/DL — SIGNIFICANT CHANGE UP (ref 3.3–5)
ALP SERPL-CCNC: 81 U/L — SIGNIFICANT CHANGE UP (ref 30–120)
ALT FLD-CCNC: 27 U/L DA — SIGNIFICANT CHANGE UP (ref 10–60)
ANION GAP SERPL CALC-SCNC: 8 MMOL/L — SIGNIFICANT CHANGE UP (ref 5–17)
APPEARANCE UR: CLEAR — SIGNIFICANT CHANGE UP
AST SERPL-CCNC: 15 U/L — SIGNIFICANT CHANGE UP (ref 10–40)
BACTERIA # UR AUTO: ABNORMAL
BASOPHILS # BLD AUTO: 0.03 K/UL — SIGNIFICANT CHANGE UP (ref 0–0.2)
BASOPHILS NFR BLD AUTO: 0.3 % — SIGNIFICANT CHANGE UP (ref 0–2)
BILIRUB SERPL-MCNC: 0.1 MG/DL — LOW (ref 0.2–1.2)
BILIRUB UR-MCNC: NEGATIVE — SIGNIFICANT CHANGE UP
BUN SERPL-MCNC: 13 MG/DL — SIGNIFICANT CHANGE UP (ref 7–23)
CALCIUM SERPL-MCNC: 8.7 MG/DL — SIGNIFICANT CHANGE UP (ref 8.4–10.5)
CHLORIDE SERPL-SCNC: 101 MMOL/L — SIGNIFICANT CHANGE UP (ref 96–108)
CO2 SERPL-SCNC: 27 MMOL/L — SIGNIFICANT CHANGE UP (ref 22–31)
COLOR SPEC: YELLOW — SIGNIFICANT CHANGE UP
COMMENT - URINE: SIGNIFICANT CHANGE UP
CREAT SERPL-MCNC: 0.53 MG/DL — SIGNIFICANT CHANGE UP (ref 0.5–1.3)
DIFF PNL FLD: ABNORMAL
EOSINOPHIL # BLD AUTO: 0.14 K/UL — SIGNIFICANT CHANGE UP (ref 0–0.5)
EOSINOPHIL NFR BLD AUTO: 1.6 % — SIGNIFICANT CHANGE UP (ref 0–6)
EPI CELLS # UR: SIGNIFICANT CHANGE UP
GLUCOSE SERPL-MCNC: 98 MG/DL — SIGNIFICANT CHANGE UP (ref 70–99)
GLUCOSE UR QL: NEGATIVE MG/DL — SIGNIFICANT CHANGE UP
HCG UR QL: NEGATIVE — SIGNIFICANT CHANGE UP
HCT VFR BLD CALC: 40.3 % — SIGNIFICANT CHANGE UP (ref 34.5–45)
HGB BLD-MCNC: 12.4 G/DL — SIGNIFICANT CHANGE UP (ref 11.5–15.5)
IMM GRANULOCYTES NFR BLD AUTO: 0.1 % — SIGNIFICANT CHANGE UP (ref 0–1.5)
KETONES UR-MCNC: NEGATIVE — SIGNIFICANT CHANGE UP
LEUKOCYTE ESTERASE UR-ACNC: NEGATIVE — SIGNIFICANT CHANGE UP
LYMPHOCYTES # BLD AUTO: 3.05 K/UL — SIGNIFICANT CHANGE UP (ref 1–3.3)
LYMPHOCYTES # BLD AUTO: 34.4 % — SIGNIFICANT CHANGE UP (ref 13–44)
MAGNESIUM SERPL-MCNC: 2 MG/DL — SIGNIFICANT CHANGE UP (ref 1.6–2.6)
MCHC RBC-ENTMCNC: 24.6 PG — LOW (ref 27–34)
MCHC RBC-ENTMCNC: 30.8 GM/DL — LOW (ref 32–36)
MCV RBC AUTO: 80 FL — SIGNIFICANT CHANGE UP (ref 80–100)
MONOCYTES # BLD AUTO: 0.72 K/UL — SIGNIFICANT CHANGE UP (ref 0–0.9)
MONOCYTES NFR BLD AUTO: 8.1 % — SIGNIFICANT CHANGE UP (ref 2–14)
NEUTROPHILS # BLD AUTO: 4.91 K/UL — SIGNIFICANT CHANGE UP (ref 1.8–7.4)
NEUTROPHILS NFR BLD AUTO: 55.5 % — SIGNIFICANT CHANGE UP (ref 43–77)
NITRITE UR-MCNC: NEGATIVE — SIGNIFICANT CHANGE UP
NRBC # BLD: 0 /100 WBCS — SIGNIFICANT CHANGE UP (ref 0–0)
PH UR: 7 — SIGNIFICANT CHANGE UP (ref 5–8)
PLATELET # BLD AUTO: 342 K/UL — SIGNIFICANT CHANGE UP (ref 150–400)
POTASSIUM SERPL-MCNC: 3.8 MMOL/L — SIGNIFICANT CHANGE UP (ref 3.5–5.3)
POTASSIUM SERPL-SCNC: 3.8 MMOL/L — SIGNIFICANT CHANGE UP (ref 3.5–5.3)
PROT SERPL-MCNC: 7.7 G/DL — SIGNIFICANT CHANGE UP (ref 6–8.3)
PROT UR-MCNC: NEGATIVE MG/DL — SIGNIFICANT CHANGE UP
RBC # BLD: 5.04 M/UL — SIGNIFICANT CHANGE UP (ref 3.8–5.2)
RBC # FLD: 13.7 % — SIGNIFICANT CHANGE UP (ref 10.3–14.5)
RBC CASTS # UR COMP ASSIST: >50 /HPF (ref 0–4)
SODIUM SERPL-SCNC: 136 MMOL/L — SIGNIFICANT CHANGE UP (ref 135–145)
SP GR SPEC: 1.01 — SIGNIFICANT CHANGE UP (ref 1.01–1.02)
TSH SERPL-MCNC: 1.45 UIU/ML — SIGNIFICANT CHANGE UP (ref 0.27–4.2)
UROBILINOGEN FLD QL: NEGATIVE MG/DL — SIGNIFICANT CHANGE UP
WBC # BLD: 8.86 K/UL — SIGNIFICANT CHANGE UP (ref 3.8–10.5)
WBC # FLD AUTO: 8.86 K/UL — SIGNIFICANT CHANGE UP (ref 3.8–10.5)
WBC UR QL: SIGNIFICANT CHANGE UP

## 2021-10-28 PROCEDURE — 85025 COMPLETE CBC W/AUTO DIFF WBC: CPT

## 2021-10-28 PROCEDURE — 81025 URINE PREGNANCY TEST: CPT

## 2021-10-28 PROCEDURE — 99283 EMERGENCY DEPT VISIT LOW MDM: CPT

## 2021-10-28 PROCEDURE — 36415 COLL VENOUS BLD VENIPUNCTURE: CPT

## 2021-10-28 PROCEDURE — 80053 COMPREHEN METABOLIC PANEL: CPT

## 2021-10-28 PROCEDURE — 83735 ASSAY OF MAGNESIUM: CPT

## 2021-10-28 PROCEDURE — 81001 URINALYSIS AUTO W/SCOPE: CPT

## 2021-10-28 PROCEDURE — 84443 ASSAY THYROID STIM HORMONE: CPT

## 2021-10-28 RX ORDER — ACETAMINOPHEN 500 MG
500 TABLET ORAL ONCE
Refills: 0 | Status: COMPLETED | OUTPATIENT
Start: 2021-10-28 | End: 2021-10-28

## 2021-10-28 RX ADMIN — Medication 500 MILLIGRAM(S): at 01:09

## 2021-10-28 RX ADMIN — Medication 500 MILLIGRAM(S): at 01:11
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
## 2022-05-24 PROBLEM — I10 ESSENTIAL (PRIMARY) HYPERTENSION: Chronic | Status: ACTIVE | Noted: 2019-12-21

## 2022-05-24 PROBLEM — O15.00 ECLAMPSIA COMPLICATING PREGNANCY, UNSPECIFIED TRIMESTER: Chronic | Status: ACTIVE | Noted: 2021-10-28

## 2022-05-24 PROBLEM — O14.90 UNSPECIFIED PRE-ECLAMPSIA, UNSPECIFIED TRIMESTER: Chronic | Status: ACTIVE | Noted: 2019-12-21

## 2022-05-26 ENCOUNTER — APPOINTMENT (OUTPATIENT)
Dept: INTERNAL MEDICINE | Facility: CLINIC | Age: 37
End: 2022-05-26
Payer: COMMERCIAL

## 2022-05-26 ENCOUNTER — NON-APPOINTMENT (OUTPATIENT)
Age: 37
End: 2022-05-26

## 2022-05-26 ENCOUNTER — LABORATORY RESULT (OUTPATIENT)
Age: 37
End: 2022-05-26

## 2022-05-26 VITALS
RESPIRATION RATE: 14 BRPM | TEMPERATURE: 97.2 F | HEART RATE: 88 BPM | HEIGHT: 67 IN | OXYGEN SATURATION: 98 % | BODY MASS INDEX: 36.73 KG/M2 | WEIGHT: 234 LBS | SYSTOLIC BLOOD PRESSURE: 138 MMHG | DIASTOLIC BLOOD PRESSURE: 88 MMHG

## 2022-05-26 DIAGNOSIS — O14.90 UNSPECIFIED PRE-ECLAMPSIA, UNSPECIFIED TRIMESTER: ICD-10-CM

## 2022-05-26 DIAGNOSIS — Z00.00 ENCOUNTER FOR GENERAL ADULT MEDICAL EXAMINATION W/OUT ABNORMAL FINDINGS: ICD-10-CM

## 2022-05-26 DIAGNOSIS — Z83.438 FAMILY HISTORY OF OTHER DISORDER OF LIPOPROTEIN METABOLISM AND OTHER LIPIDEMIA: ICD-10-CM

## 2022-05-26 PROCEDURE — 99385 PREV VISIT NEW AGE 18-39: CPT

## 2022-05-26 NOTE — HISTORY OF PRESENT ILLNESS
[de-identified] : 36 y.o. F with PMHx of preeclampsia presents as new pt to establish care. Pt went to [facility] ER 2 days ago for right sided neck pain. She was given flexeril and ibuprofen. Her BP in the ER was 158/100.

## 2022-05-26 NOTE — PLAN
[FreeTextEntry1] : HCM:\par -check labs\par -advise f/u with GYN for pap smear\par \par Elevated BP: advised to check BP at home for 2 weeks and call back with readings, counseled on diet and exercise, reduce salt intake

## 2022-05-27 LAB
25(OH)D3 SERPL-MCNC: 14 NG/ML
ALBUMIN SERPL ELPH-MCNC: 4.4 G/DL
ALP BLD-CCNC: 95 U/L
ALT SERPL-CCNC: 12 U/L
ANION GAP SERPL CALC-SCNC: 12 MMOL/L
AST SERPL-CCNC: 15 U/L
BASOPHILS # BLD AUTO: 0.04 K/UL
BASOPHILS NFR BLD AUTO: 0.6 %
BILIRUB SERPL-MCNC: 0.3 MG/DL
BUN SERPL-MCNC: 7 MG/DL
CALCIUM SERPL-MCNC: 9.4 MG/DL
CHLORIDE SERPL-SCNC: 104 MMOL/L
CHOLEST SERPL-MCNC: 197 MG/DL
CO2 SERPL-SCNC: 24 MMOL/L
CREAT SERPL-MCNC: 0.52 MG/DL
EGFR: 124 ML/MIN/1.73M2
EOSINOPHIL # BLD AUTO: 0.17 K/UL
EOSINOPHIL NFR BLD AUTO: 2.6 %
ESTIMATED AVERAGE GLUCOSE: 114 MG/DL
FOLATE SERPL-MCNC: 9.5 NG/ML
GLUCOSE SERPL-MCNC: 90 MG/DL
HBA1C MFR BLD HPLC: 5.6 %
HCT VFR BLD CALC: 41.6 %
HDLC SERPL-MCNC: 51 MG/DL
HGB BLD-MCNC: 12.9 G/DL
IMM GRANULOCYTES NFR BLD AUTO: 0.2 %
LDLC SERPL CALC-MCNC: 128 MG/DL
LYMPHOCYTES # BLD AUTO: 2.5 K/UL
LYMPHOCYTES NFR BLD AUTO: 38.4 %
MAN DIFF?: NORMAL
MCHC RBC-ENTMCNC: 25.1 PG
MCHC RBC-ENTMCNC: 31 GM/DL
MCV RBC AUTO: 80.9 FL
MONOCYTES # BLD AUTO: 0.54 K/UL
MONOCYTES NFR BLD AUTO: 8.3 %
NEUTROPHILS # BLD AUTO: 3.25 K/UL
NEUTROPHILS NFR BLD AUTO: 49.9 %
NONHDLC SERPL-MCNC: 146 MG/DL
PLATELET # BLD AUTO: 365 K/UL
POTASSIUM SERPL-SCNC: 4.1 MMOL/L
PROT SERPL-MCNC: 7.3 G/DL
RBC # BLD: 5.14 M/UL
RBC # FLD: 13.7 %
SODIUM SERPL-SCNC: 139 MMOL/L
TRIGL SERPL-MCNC: 90 MG/DL
TSH SERPL-ACNC: 0.82 UIU/ML
VIT B12 SERPL-MCNC: 779 PG/ML
WBC # FLD AUTO: 6.51 K/UL

## 2023-02-27 ENCOUNTER — EMERGENCY (EMERGENCY)
Facility: HOSPITAL | Age: 38
LOS: 1 days | Discharge: HOME | End: 2023-02-27
Attending: EMERGENCY MEDICINE | Admitting: EMERGENCY MEDICINE
Payer: COMMERCIAL

## 2023-02-27 VITALS
RESPIRATION RATE: 18 BRPM | OXYGEN SATURATION: 100 % | HEIGHT: 67 IN | HEART RATE: 92 BPM | WEIGHT: 235.01 LBS | SYSTOLIC BLOOD PRESSURE: 149 MMHG | TEMPERATURE: 98 F | DIASTOLIC BLOOD PRESSURE: 87 MMHG

## 2023-02-27 VITALS — OXYGEN SATURATION: 100 %

## 2023-02-27 LAB
ALBUMIN SERPL ELPH-MCNC: 3.5 G/DL — SIGNIFICANT CHANGE UP (ref 3.3–5)
ALP SERPL-CCNC: 92 U/L — SIGNIFICANT CHANGE UP (ref 30–120)
ALT FLD-CCNC: 25 U/L DA — SIGNIFICANT CHANGE UP (ref 10–60)
ANION GAP SERPL CALC-SCNC: 8 MMOL/L — SIGNIFICANT CHANGE UP (ref 5–17)
APPEARANCE UR: ABNORMAL
APTT BLD: 30.6 SEC — SIGNIFICANT CHANGE UP (ref 27.5–35.5)
AST SERPL-CCNC: 26 U/L — SIGNIFICANT CHANGE UP (ref 10–40)
BACTERIA # UR AUTO: ABNORMAL
BASOPHILS # BLD AUTO: 0.12 K/UL — SIGNIFICANT CHANGE UP (ref 0–0.2)
BASOPHILS NFR BLD AUTO: 1 % — SIGNIFICANT CHANGE UP (ref 0–2)
BILIRUB SERPL-MCNC: 0.3 MG/DL — SIGNIFICANT CHANGE UP (ref 0.2–1.2)
BILIRUB UR-MCNC: NEGATIVE — SIGNIFICANT CHANGE UP
BUN SERPL-MCNC: 6 MG/DL — LOW (ref 7–23)
CALCIUM SERPL-MCNC: 9 MG/DL — SIGNIFICANT CHANGE UP (ref 8.4–10.5)
CHLORIDE SERPL-SCNC: 105 MMOL/L — SIGNIFICANT CHANGE UP (ref 96–108)
CO2 SERPL-SCNC: 26 MMOL/L — SIGNIFICANT CHANGE UP (ref 22–31)
COLOR SPEC: YELLOW — SIGNIFICANT CHANGE UP
CREAT SERPL-MCNC: <0.2 MG/DL — LOW (ref 0.5–1.3)
DIFF PNL FLD: ABNORMAL
EGFR: 162 ML/MIN/1.73M2 — SIGNIFICANT CHANGE UP
EOSINOPHIL # BLD AUTO: 0 K/UL — SIGNIFICANT CHANGE UP (ref 0–0.5)
EOSINOPHIL NFR BLD AUTO: 0 % — SIGNIFICANT CHANGE UP (ref 0–6)
EPI CELLS # UR: ABNORMAL
GLUCOSE SERPL-MCNC: 100 MG/DL — HIGH (ref 70–99)
GLUCOSE UR QL: NEGATIVE MG/DL — SIGNIFICANT CHANGE UP
HCG UR QL: NEGATIVE — SIGNIFICANT CHANGE UP
HCT VFR BLD CALC: 41 % — SIGNIFICANT CHANGE UP (ref 34.5–45)
HGB BLD-MCNC: 12.9 G/DL — SIGNIFICANT CHANGE UP (ref 11.5–15.5)
HMPV RNA SPEC QL NAA+PROBE: DETECTED
INR BLD: 1.22 RATIO — HIGH (ref 0.88–1.16)
KETONES UR-MCNC: ABNORMAL
LACTATE SERPL-SCNC: 0.7 MMOL/L — SIGNIFICANT CHANGE UP (ref 0.7–2)
LEUKOCYTE ESTERASE UR-ACNC: ABNORMAL
LYMPHOCYTES # BLD AUTO: 17 % — SIGNIFICANT CHANGE UP (ref 13–44)
LYMPHOCYTES # BLD AUTO: 2 K/UL — SIGNIFICANT CHANGE UP (ref 1–3.3)
MANUAL SMEAR VERIFICATION: YES — SIGNIFICANT CHANGE UP
MCHC RBC-ENTMCNC: 24.5 PG — LOW (ref 27–34)
MCHC RBC-ENTMCNC: 31.5 GM/DL — LOW (ref 32–36)
MCV RBC AUTO: 77.8 FL — LOW (ref 80–100)
MONOCYTES # BLD AUTO: 0.94 K/UL — HIGH (ref 0–0.9)
MONOCYTES NFR BLD AUTO: 8 % — SIGNIFICANT CHANGE UP (ref 2–14)
NEUTROPHILS # BLD AUTO: 8.69 K/UL — HIGH (ref 1.8–7.4)
NEUTROPHILS NFR BLD AUTO: 70 % — SIGNIFICANT CHANGE UP (ref 43–77)
NEUTS BAND # BLD: 4 % — SIGNIFICANT CHANGE UP (ref 0–8)
NITRITE UR-MCNC: NEGATIVE — SIGNIFICANT CHANGE UP
NRBC # BLD: 0 — SIGNIFICANT CHANGE UP
NRBC # BLD: SIGNIFICANT CHANGE UP /100 WBCS (ref 0–0)
PH UR: 6 — SIGNIFICANT CHANGE UP (ref 5–8)
PLAT MORPH BLD: NORMAL — SIGNIFICANT CHANGE UP
PLATELET # BLD AUTO: 374 K/UL — SIGNIFICANT CHANGE UP (ref 150–400)
PLATELET COUNT - ESTIMATE: NORMAL — SIGNIFICANT CHANGE UP
POTASSIUM SERPL-MCNC: 3.9 MMOL/L — SIGNIFICANT CHANGE UP (ref 3.5–5.3)
POTASSIUM SERPL-SCNC: 3.9 MMOL/L — SIGNIFICANT CHANGE UP (ref 3.5–5.3)
PROT SERPL-MCNC: 8 G/DL — SIGNIFICANT CHANGE UP (ref 6–8.3)
PROT UR-MCNC: 30 MG/DL
PROTHROM AB SERPL-ACNC: 14.1 SEC — HIGH (ref 10.5–13.4)
RAPID RVP RESULT: DETECTED
RBC # BLD: 5.27 M/UL — HIGH (ref 3.8–5.2)
RBC # FLD: 13.4 % — SIGNIFICANT CHANGE UP (ref 10.3–14.5)
RBC BLD AUTO: NORMAL — SIGNIFICANT CHANGE UP
RBC CASTS # UR COMP ASSIST: ABNORMAL /HPF (ref 0–4)
SARS-COV-2 RNA SPEC QL NAA+PROBE: SIGNIFICANT CHANGE UP
SODIUM SERPL-SCNC: 139 MMOL/L — SIGNIFICANT CHANGE UP (ref 135–145)
SP GR SPEC: 1.02 — SIGNIFICANT CHANGE UP (ref 1.01–1.02)
UROBILINOGEN FLD QL: NEGATIVE MG/DL — SIGNIFICANT CHANGE UP
WBC # BLD: 11.74 K/UL — HIGH (ref 3.8–10.5)
WBC # FLD AUTO: 11.74 K/UL — HIGH (ref 3.8–10.5)
WBC UR QL: ABNORMAL

## 2023-02-27 PROCEDURE — 0225U NFCT DS DNA&RNA 21 SARSCOV2: CPT

## 2023-02-27 PROCEDURE — 94640 AIRWAY INHALATION TREATMENT: CPT

## 2023-02-27 PROCEDURE — 85025 COMPLETE CBC W/AUTO DIFF WBC: CPT

## 2023-02-27 PROCEDURE — 85730 THROMBOPLASTIN TIME PARTIAL: CPT

## 2023-02-27 PROCEDURE — 83605 ASSAY OF LACTIC ACID: CPT

## 2023-02-27 PROCEDURE — 99284 EMERGENCY DEPT VISIT MOD MDM: CPT | Mod: 25

## 2023-02-27 PROCEDURE — 36415 COLL VENOUS BLD VENIPUNCTURE: CPT

## 2023-02-27 PROCEDURE — 71046 X-RAY EXAM CHEST 2 VIEWS: CPT | Mod: 26

## 2023-02-27 PROCEDURE — 85610 PROTHROMBIN TIME: CPT

## 2023-02-27 PROCEDURE — 80053 COMPREHEN METABOLIC PANEL: CPT

## 2023-02-27 PROCEDURE — 71046 X-RAY EXAM CHEST 2 VIEWS: CPT

## 2023-02-27 PROCEDURE — 87040 BLOOD CULTURE FOR BACTERIA: CPT

## 2023-02-27 PROCEDURE — 81001 URINALYSIS AUTO W/SCOPE: CPT

## 2023-02-27 PROCEDURE — 81025 URINE PREGNANCY TEST: CPT

## 2023-02-27 PROCEDURE — 96374 THER/PROPH/DIAG INJ IV PUSH: CPT

## 2023-02-27 PROCEDURE — 99284 EMERGENCY DEPT VISIT MOD MDM: CPT

## 2023-02-27 RX ORDER — AZITHROMYCIN 500 MG/1
500 TABLET, FILM COATED ORAL ONCE
Refills: 0 | Status: COMPLETED | OUTPATIENT
Start: 2023-02-27 | End: 2023-02-27

## 2023-02-27 RX ORDER — SODIUM CHLORIDE 9 MG/ML
1000 INJECTION INTRAMUSCULAR; INTRAVENOUS; SUBCUTANEOUS ONCE
Refills: 0 | Status: COMPLETED | OUTPATIENT
Start: 2023-02-27 | End: 2023-02-27

## 2023-02-27 RX ORDER — AZITHROMYCIN 500 MG/1
1 TABLET, FILM COATED ORAL
Qty: 4 | Refills: 0
Start: 2023-02-27 | End: 2023-03-02

## 2023-02-27 RX ORDER — IPRATROPIUM/ALBUTEROL SULFATE 18-103MCG
3 AEROSOL WITH ADAPTER (GRAM) INHALATION ONCE
Refills: 0 | Status: COMPLETED | OUTPATIENT
Start: 2023-02-27 | End: 2023-02-27

## 2023-02-27 RX ADMIN — SODIUM CHLORIDE 1000 MILLILITER(S): 9 INJECTION INTRAMUSCULAR; INTRAVENOUS; SUBCUTANEOUS at 14:26

## 2023-02-27 RX ADMIN — AZITHROMYCIN 255 MILLIGRAM(S): 500 TABLET, FILM COATED ORAL at 14:28

## 2023-02-27 RX ADMIN — Medication 3 MILLILITER(S): at 14:42

## 2023-02-27 RX ADMIN — SODIUM CHLORIDE 1000 MILLILITER(S): 9 INJECTION INTRAMUSCULAR; INTRAVENOUS; SUBCUTANEOUS at 14:34

## 2023-02-27 NOTE — ED PROVIDER NOTE - PATIENT PORTAL LINK FT
You can access the FollowMyHealth Patient Portal offered by [facility] by registering at the following website: [email or web address]. By joining [employer]’s FollowMyHealth portal, you will also be able to view your health information using other applications (apps) compatible with our system.

## 2023-02-27 NOTE — ED PROVIDER NOTE - CCCP TRG CHIEF CMPLNT
SUBJECTIVE:   Den Rivera is a 23 year old male presenting with a chief complaint of   Chief Complaint   Patient presents with     Urgent Care     Triage     Earlier today while sitting down suddenly had very intense sharp pain in left anterior rib area, lasting a couple seconds, followed by less intense pain for the next few minutes.  Also felt some transient sharp pain under right ribs/RUQ of abdomen earlier.  Reports less intense pain (3 - 4/10) in both right and left rib areas has continued all day.       Triage     Denies nausea, sweating.  Did have some shortness of breath earlier while walking, now chest feels a bit tight.  States pain is not worse with breathing.  Denies any use of cocaine or other street drugs.     Triage     Reports has been worked up for chest pain in the past, was told it might be costochondritis or GERD-related; takes omeprazole daily.       He is a new patient of [facility].    Seen by primary for chest pain in past, he is visiting from [facility].  He is here visiting in grad school      In Jan 2018 EKG performed, CXR, CBC , CMP, lipids - all ok      Dx costrochondrititis - tx nsaids  Also gerd  - treatment Prilosec    He called his clinic today and they recommended going in urgent care for evaluation of blood clots as he has been flying to different locations visiting grad schools    chest pain symptoms Recurred today  Had very sharp pain/squeezing 8/10, lasting 1-2 secs, then dull ache for few minutes 5-/10 left side at just under breast    Also on right side sometimes, just under breast area to    Hx chest pain for few years, with most recent evaluation noted above    Pain not with breathing    Plans to fly back to [place] tomorrow    When asked about stress, patient states he has been under stress but seeing things seem to be going well currently.  He did get an email yesterday with bad news regarding a scholarship and that did upset him.    Cardiac risk factors:  none.    negative fam hx heart dz  Parents healthy      Review of Systems   Constitutional: Negative for chills and fever.   HENT: Positive for rhinorrhea. Negative for sore throat.         Mild runny nose   Eyes: Negative for discharge and redness.   Respiratory: Positive for chest tightness. Negative for shortness of breath and wheezing.         Infreq cough+  Dry cough     Cardiovascular: Negative for palpitations and leg swelling.   Gastrointestinal: Negative for abdominal pain and vomiting.   Genitourinary: Negative for dysuria and hematuria.   Musculoskeletal: Negative for arthralgias.        Sore ankle  Neck pain yesterday   Skin: Negative for rash.   Neurological: Positive for headaches. Negative for dizziness.        Minor ha on side of temples, ealier in back of head   Hematological: Does not bruise/bleed easily.   Psychiatric/Behavioral:        Under stress, flying to different places for Grad school       PMHx - gerd  Asthma - exercise/allergy relatedas child    FAm Hx - no cardiac dz   Past Medical History:   Diagnosis Date     Asthma     exercise, allergy related as child       Current Outpatient Prescriptions   Medication Sig Dispense Refill     OMEPRAZOLE PO        naproxen (NAPROSYN) 500 MG tablet Take 1 tablet (500 mg) by mouth 2 times daily (with meals) 60 tablet 0     Social History   Substance Use Topics     Smoking status: Never Smoker     Smokeless tobacco: Never Used     Alcohol use Not on file       OBJECTIVE  /76  Pulse 88  Temp 98.8  F (37.1  C) (Oral)  SpO2 99%    Physical Exam   Constitutional: He appears well-developed and well-nourished. No distress.   HENT:   Mouth/Throat: Oropharynx is clear and moist.   tympanic membrane clear   Op pnd   Eyes: Conjunctivae are normal.   Neck: No thyromegaly present.   Cardiovascular: Normal rate, regular rhythm, normal heart sounds and intact distal pulses.    No murmur heard.  Pulmonary/Chest: Effort normal and breath sounds normal. No  respiratory distress. He has no wheezes.   I was unable to reproduce the exact symptoms patient was having with palpation of his chest wall.  I did cause pain with palpation in the anterior chest wall but not the exact pain patient had  He did have sharp left-sided chest pain when moving from the laying position to the sitting position that lasted a few seconds during his exam   Abdominal: Soft. Bowel sounds are normal. He exhibits no distension. There is no tenderness. There is no rebound and no guarding.   Musculoskeletal: He exhibits no edema.   No CVa tenderness   Lymphadenopathy:     He has no cervical adenopathy.   Skin: No rash noted. He is not diaphoretic.   Psychiatric: He has a normal mood and affect.       Labs:  No results found for this or any previous visit (from the past 24 hour(s)).    X-Ray was done, my findings are: Lungs appear clear.  No masses no pneumothorax    ekg normal sinus rhythm     ASSESSMENT:      ICD-10-CM    1. Atypical chest pain R07.89 XR Chest 2 Views     EKG 12-lead complete w/read - Clinics     naproxen (NAPROSYN) 500 MG tablet        Medical Decision Making:    Differential Diagnosis:  CAD -EKG appears normal  Chest wall Pain -some of the symptoms and exam could be related chest wall pain  Pleurisy -symptoms possibly could be related to some pleurisy  GERD -he is on a proton pump inhibitor, doubt this is exact cause of his symptoms currently  Pericarditis -EKG shows no abnormalities to suggest pericarditis  Panic/Anxiety -discussed stress can cause atypical chest pain symptoms and tightness  Pneumothorax -no pneumothorax noted on chest x-ray  Discussed regarding blood clots to lungs that his oxygen level is normal, his symptoms are not with inspiration and he has no swelling of the legs  Reassured that his vital signs and oxygen level are normal    Exact etiology unknown for atyp chest pain   But reassured exam, vitals, cxr, ekg nl   Restart nsaid for pleurisy/costocondritis and  stress reduction     and recheck with primary clinic  To ER if worse.    Serious Comorbid Conditions:  Adult:  None    PLAN:      Patient Instructions     chest x-ray & EKG normal     Vitals, oxygen levels and exam are normal     Please start naprosyn 2 x day as may have constrochondritis  Also stress reduction  exercise    Plan recheck over next week with primary            *CHEST PAIN, NONCARDIAC    Based on your visit today, the exact cause of your chest pain is not certain. Your condition does not seem serious and your pain does not appear to be coming from your heart. However, sometimes the signs of a serious problem take more time to appear. Therefore, please watch for the warning signs listed below.  HOME CARE:  1. Rest today and avoid strenuous activity.  2. Take any prescribed medicine as directed.  FOLLOW UP with your doctor in 1-3 days.   GET PROMPT MEDICAL ATTENTION if any of the following occur:    A change in the type of pain: if it feels different, becomes more severe, lasts longer, or begins to spread into your shoulder, arm, neck, jaw or back    Shortness of breath or increased pain with breathing    Cough with blood or dark colored sputum (phlegm)    Weakness, dizziness, or fainting    Fever over 101  F (38.3  C)    Swelling, pain or redness in one leg    [id number] [email or web address]. [address] [place] [address], [place] [id number]. All rights reserved. This information is not intended as a substitute for professional medical care. Always follow your healthcare professional's instructions.  This information has been modified by your health care provider with permission from the publisher.           cough

## 2023-02-27 NOTE — ED ADULT NURSE NOTE - NSIMPLEMENTINTERV_GEN_ALL_ED
Implemented All Fall with Harm Risk Interventions:  [place] to call system. Call bell, personal items and telephone within reach. Instruct patient to call for assistance. Room bathroom lighting operational. Non-slip footwear when patient is off stretcher. Physically safe environment: no spills, clutter or unnecessary equipment. Stretcher in lowest position, wheels locked, appropriate side rails in place. Provide visual cue, wrist band, yellow gown, etc. Monitor gait and stability. Monitor for mental status changes and reorient to person, place, and time. Review medications for side effects contributing to fall risk. Reinforce activity limits and safety measures with patient and family. Provide visual clues: red socks.

## 2023-02-27 NOTE — ED PROVIDER NOTE - NSICDXPASTMEDICALHX_GEN_ALL_CORE_FT
PAST MEDICAL HISTORY:  Eclampsia affecting pregnancy, antepartum     HTN (hypertension)     Preeclampsia
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
## 2023-02-27 NOTE — ED PROVIDER NOTE - PROVIDER TOKENS
PROVIDER:[TOKEN:[[id number]],FOLLOWUP:[Urgent],ESTABLISHEDPATIENT:[T]],PROVIDER:[TOKEN:[[id number]],FOLLOWUP:[1-3 Days]]

## 2023-02-27 NOTE — ED PROVIDER NOTE - CARE PROVIDER_API CALL
Angy Rocha (DO)  Family Medicine  [address] [place] [address]  Phone: [phone]  Fax: [phone]  Established Patient  Follow Up Time: Urgent    Juan Callejas)  Critical Care Medicine; Internal Medicine; Pulmonary Disease  [address] [place] [address]  Phone: [phone]  Fax: [phone]  Follow Up Time: 1-3 Days

## 2023-02-27 NOTE — ED PROVIDER NOTE - NSFOLLOWUPINSTRUCTIONS_ED_ALL_ED_FT
Community-Acquired Pneumonia, Adult      Pneumonia is an infection of the lungs. It causes irritation and swelling in the airways of the lungs. Mucus and fluid may also build up inside the airways. This may cause coughing and trouble breathing.    One type of pneumonia can happen while you are in a hospital. A different type can happen when you are not in a hospital (community-acquired pneumonia).      What are the causes?     This condition is caused by germs (viruses, bacteria, or fungi). Some types of germs can spread from person to person. Pneumonia is not thought to spread from person to person.      What increases the risk?    You are more likely to develop this condition if:•You have a long-term (chronic) disease, such as:  •Disease of the lungs. This may be chronic obstructive pulmonary disease (COPD) or asthma.      •Heart failure.      •Cystic fibrosis.      •Diabetes.      •Kidney disease.      •Sickle cell disease.      •HIV.      •You have other health problems, such as:  •Your body's defense system (immune system) is weak.      •A condition that may cause you to breathe in fluids from your mouth and nose.        •You had your spleen taken out.      •You do not take good care of your teeth and mouth (poor dental hygiene).      •You use or have used tobacco products.      •You travel where the germs that cause this illness are common.      •You are near certain animals or the places they live.      •You are older than 65 years of age.        What are the signs or symptoms?    Symptoms of this condition include:  •A cough.      •A fever.      •Sweating or chills.      •Chest pain, often when you breathe deeply or cough.    •Breathing problems, such as:  •Fast breathing.       •Trouble breathing.      •Shortness of breath.         •Feeling tired (fatigued).      •Muscle aches.        How is this treated?    Treatment for this condition depends on many things, such as:  •The cause of your illness.      •Your medicines.      •Your other health problems.      Most adults can be treated at home. Sometimes, treatment must happen in a hospital.  •Treatment may include medicines to kill germs.      •Medicines may depend on which germ caused your illness.      Very bad pneumonia is rare. If you get it, you may:  •Have a machine to help you breathe.      •Have fluid taken away from around your lungs.        Follow these instructions at home:    Medicines     •Take over-the-counter and prescription medicines only as told by your doctor.      •Take cough medicine only if you are losing sleep. Cough medicine can keep your body from taking mucus away from your lungs.      •If you were prescribed an antibiotic medicine, take it as told by your doctor. Do not stop taking the antibiotic even if you start to feel better.        Lifestyle                   • Do not drink alcohol.      • Do not use any products that contain nicotine or tobacco, such as cigarettes, e-cigarettes, and chewing tobacco. If you need help quitting, ask your doctor.      •Eat a healthy diet. This includes a lot of vegetables, fruits, whole grains, low-fat dairy products, and low-fat (lean) protein.        General instructions      •Rest a lot. Sleep for at least 8 hours each night.      •Sleep with your head and neck raised. Put a few pillows under your head or sleep in a reclining chair.      •Return to your normal activities as told by your doctor. Ask your doctor what activities are safe for you.      •Drink enough fluid to keep your pee (urine) pale yellow.      •If your throat is sore, rinse your mouth often with salt water. To make salt water, dissolve ½–1 tsp (3–6 g) of salt in 1 cup (237 mL) of warm water.      •Keep all follow-up visits as told by your doctor. This is important.        How is this prevented?    You can lower your risk of pneumonia by:•Getting the pneumonia shot (vaccine). These shots have different types and schedules. Ask your doctor what works best for you. Think about getting this shot if:  •You are older than 65 years of age.    •You are 19–65 years of age and:   •You are being treated for cancer.      •You have long-term lung disease.      •You have other problems that affect your body's defense system. Ask your doctor if you have one of these.          •Getting your flu shot every year. Ask your doctor which type of shot is best for you.      •Going to the dentist as often as told.      •Washing your hands often with soap and water for at least 20 seconds. If you cannot use soap and water, use hand .        Contact a doctor if:    •You have a fever.      •You lose sleep because your cough medicine does not help.        Get help right away if:    •You are short of breath and this gets worse.      •You have more chest pain.    •Your sickness gets worse. This is very serious if:  •You are an older adult.      •Your body's defense system is weak.        •You cough up blood.      These symptoms may be an emergency. Do not wait to see if the symptoms will go away. Get medical help right away. Call your local emergency services (911 in the [place] Do not drive yourself to the hospital.       Summary    •Pneumonia is an infection of the lungs.      •Community-acquired pneumonia affects people who have not been in the hospital. Certain germs can cause this infection.      •This condition may be treated with medicines that kill germs.      •For very bad pneumonia, you may need a hospital stay and treatment to help with breathing.      This information is not intended to replace advice given to you by your health care provider. Make sure you discuss any questions you have with your health care provider.

## 2023-03-04 LAB
CULTURE RESULTS: SIGNIFICANT CHANGE UP
CULTURE RESULTS: SIGNIFICANT CHANGE UP
SPECIMEN SOURCE: SIGNIFICANT CHANGE UP
SPECIMEN SOURCE: SIGNIFICANT CHANGE UP

## 2023-04-12 ENCOUNTER — EMERGENCY (EMERGENCY)
Facility: HOSPITAL | Age: 38
LOS: 1 days | Discharge: HOME | End: 2023-04-12
Attending: EMERGENCY MEDICINE | Admitting: EMERGENCY MEDICINE
Payer: COMMERCIAL

## 2023-04-12 VITALS
SYSTOLIC BLOOD PRESSURE: 146 MMHG | HEIGHT: 67 IN | WEIGHT: 237 LBS | DIASTOLIC BLOOD PRESSURE: 94 MMHG | HEART RATE: 96 BPM | TEMPERATURE: 99 F | RESPIRATION RATE: 16 BRPM | OXYGEN SATURATION: 96 %

## 2023-04-12 VITALS
RESPIRATION RATE: 15 BRPM | OXYGEN SATURATION: 99 % | HEART RATE: 88 BPM | DIASTOLIC BLOOD PRESSURE: 75 MMHG | TEMPERATURE: 99 F | SYSTOLIC BLOOD PRESSURE: 135 MMHG

## 2023-04-12 LAB
ALBUMIN SERPL ELPH-MCNC: 3.5 G/DL — SIGNIFICANT CHANGE UP (ref 3.3–5)
ALP SERPL-CCNC: 97 U/L — SIGNIFICANT CHANGE UP (ref 30–120)
ALT FLD-CCNC: 21 U/L DA — SIGNIFICANT CHANGE UP (ref 10–60)
ANION GAP SERPL CALC-SCNC: 7 MMOL/L — SIGNIFICANT CHANGE UP (ref 5–17)
AST SERPL-CCNC: 16 U/L — SIGNIFICANT CHANGE UP (ref 10–40)
BASOPHILS # BLD AUTO: 0.04 K/UL — SIGNIFICANT CHANGE UP (ref 0–0.2)
BASOPHILS NFR BLD AUTO: 0.5 % — SIGNIFICANT CHANGE UP (ref 0–2)
BILIRUB SERPL-MCNC: 0.3 MG/DL — SIGNIFICANT CHANGE UP (ref 0.2–1.2)
BUN SERPL-MCNC: 9 MG/DL — SIGNIFICANT CHANGE UP (ref 7–23)
CALCIUM SERPL-MCNC: 9 MG/DL — SIGNIFICANT CHANGE UP (ref 8.4–10.5)
CHLORIDE SERPL-SCNC: 103 MMOL/L — SIGNIFICANT CHANGE UP (ref 96–108)
CO2 SERPL-SCNC: 28 MMOL/L — SIGNIFICANT CHANGE UP (ref 22–31)
CREAT SERPL-MCNC: 0.62 MG/DL — SIGNIFICANT CHANGE UP (ref 0.5–1.3)
D DIMER BLD IA.RAPID-MCNC: <150 NG/ML DDU — SIGNIFICANT CHANGE UP
EGFR: 118 ML/MIN/1.73M2 — SIGNIFICANT CHANGE UP
EOSINOPHIL # BLD AUTO: 0.41 K/UL — SIGNIFICANT CHANGE UP (ref 0–0.5)
EOSINOPHIL NFR BLD AUTO: 4.8 % — SIGNIFICANT CHANGE UP (ref 0–6)
FLUAV AG NPH QL: SIGNIFICANT CHANGE UP
FLUBV AG NPH QL: SIGNIFICANT CHANGE UP
GLUCOSE SERPL-MCNC: 90 MG/DL — SIGNIFICANT CHANGE UP (ref 70–99)
HCG UR QL: NEGATIVE — SIGNIFICANT CHANGE UP
HCT VFR BLD CALC: 40.9 % — SIGNIFICANT CHANGE UP (ref 34.5–45)
HGB BLD-MCNC: 12.4 G/DL — SIGNIFICANT CHANGE UP (ref 11.5–15.5)
IMM GRANULOCYTES NFR BLD AUTO: 0.2 % — SIGNIFICANT CHANGE UP (ref 0–0.9)
LYMPHOCYTES # BLD AUTO: 2.25 K/UL — SIGNIFICANT CHANGE UP (ref 1–3.3)
LYMPHOCYTES # BLD AUTO: 26.5 % — SIGNIFICANT CHANGE UP (ref 13–44)
MCHC RBC-ENTMCNC: 24.1 PG — LOW (ref 27–34)
MCHC RBC-ENTMCNC: 30.3 GM/DL — LOW (ref 32–36)
MCV RBC AUTO: 79.4 FL — LOW (ref 80–100)
MONOCYTES # BLD AUTO: 0.95 K/UL — HIGH (ref 0–0.9)
MONOCYTES NFR BLD AUTO: 11.2 % — SIGNIFICANT CHANGE UP (ref 2–14)
NEUTROPHILS # BLD AUTO: 4.83 K/UL — SIGNIFICANT CHANGE UP (ref 1.8–7.4)
NEUTROPHILS NFR BLD AUTO: 56.8 % — SIGNIFICANT CHANGE UP (ref 43–77)
NRBC # BLD: 0 /100 WBCS — SIGNIFICANT CHANGE UP (ref 0–0)
PLATELET # BLD AUTO: 341 K/UL — SIGNIFICANT CHANGE UP (ref 150–400)
POTASSIUM SERPL-MCNC: 4.1 MMOL/L — SIGNIFICANT CHANGE UP (ref 3.5–5.3)
POTASSIUM SERPL-SCNC: 4.1 MMOL/L — SIGNIFICANT CHANGE UP (ref 3.5–5.3)
PROT SERPL-MCNC: 7.4 G/DL — SIGNIFICANT CHANGE UP (ref 6–8.3)
RAPID RVP RESULT: DETECTED
RBC # BLD: 5.15 M/UL — SIGNIFICANT CHANGE UP (ref 3.8–5.2)
RBC # FLD: 14 % — SIGNIFICANT CHANGE UP (ref 10.3–14.5)
RSV RNA NPH QL NAA+NON-PROBE: SIGNIFICANT CHANGE UP
RV+EV RNA SPEC QL NAA+PROBE: DETECTED
SARS-COV-2 RNA SPEC QL NAA+PROBE: SIGNIFICANT CHANGE UP
SARS-COV-2 RNA SPEC QL NAA+PROBE: SIGNIFICANT CHANGE UP
SODIUM SERPL-SCNC: 138 MMOL/L — SIGNIFICANT CHANGE UP (ref 135–145)
TROPONIN I, HIGH SENSITIVITY RESULT: 5.9 NG/L — SIGNIFICANT CHANGE UP
WBC # BLD: 8.5 K/UL — SIGNIFICANT CHANGE UP (ref 3.8–10.5)
WBC # FLD AUTO: 8.5 K/UL — SIGNIFICANT CHANGE UP (ref 3.8–10.5)

## 2023-04-12 PROCEDURE — 93005 ELECTROCARDIOGRAM TRACING: CPT

## 2023-04-12 PROCEDURE — 81025 URINE PREGNANCY TEST: CPT

## 2023-04-12 PROCEDURE — 99285 EMERGENCY DEPT VISIT HI MDM: CPT

## 2023-04-12 PROCEDURE — 0225U NFCT DS DNA&RNA 21 SARSCOV2: CPT

## 2023-04-12 PROCEDURE — 85025 COMPLETE CBC W/AUTO DIFF WBC: CPT

## 2023-04-12 PROCEDURE — 87637 SARSCOV2&INF A&B&RSV AMP PRB: CPT

## 2023-04-12 PROCEDURE — 99285 EMERGENCY DEPT VISIT HI MDM: CPT | Mod: 25

## 2023-04-12 PROCEDURE — 36415 COLL VENOUS BLD VENIPUNCTURE: CPT

## 2023-04-12 PROCEDURE — 80053 COMPREHEN METABOLIC PANEL: CPT

## 2023-04-12 PROCEDURE — 84484 ASSAY OF TROPONIN QUANT: CPT

## 2023-04-12 PROCEDURE — 93010 ELECTROCARDIOGRAM REPORT: CPT

## 2023-04-12 PROCEDURE — 71046 X-RAY EXAM CHEST 2 VIEWS: CPT

## 2023-04-12 PROCEDURE — 71046 X-RAY EXAM CHEST 2 VIEWS: CPT | Mod: 26

## 2023-04-12 PROCEDURE — 85379 FIBRIN DEGRADATION QUANT: CPT

## 2023-04-12 RX ORDER — AZITHROMYCIN 500 MG/1
1 TABLET, FILM COATED ORAL
Qty: 1 | Refills: 0
Start: 2023-04-12 | End: 2023-04-16

## 2023-04-12 NOTE — ED ADULT NURSE NOTE - OBJECTIVE STATEMENT
Pt is alert and oriented. Pt states that she had pneumonia in February. Pt states that she has had a cough for 2 days. Pt states that she is coughing up green mucous. Pt denies fevers. Pt states that she has pain in the right ribs when she takes a deep breath. Pts lungs are clear to ascultation. Pt denies sob, chest pain, nausea, vomiting, and dizziness. Pt resp are even and unlabored, skin color paz for race. Pt updated on plan of care.

## 2023-04-12 NOTE — ED PROVIDER NOTE - CARE PROVIDER_API CALL
YOUR PMD,   Phone: (   )    -  Fax: (   )    -  Follow Up Time: 1-3 Days    Nika Plaza)  Internal Medicine  [address] [place] [address]  Phone: [phone]  Fax: [phone]  Follow Up Time: 1-3 Days

## 2023-04-12 NOTE — ED PROVIDER NOTE - PROVIDER TOKENS
FREE:[LAST:[YOUR PMD],PHONE:[(   )    -],FAX:[(   )    -],FOLLOWUP:[1-3 Days]],PROVIDER:[TOKEN:[[id number]],FOLLOWUP:[1-3 Days]]

## 2023-04-12 NOTE — ED PROVIDER NOTE - PATIENT PORTAL LINK FT
You can access the FollowMyHealth Patient Portal offered by [facility] by registering at the following website: [email or web address]. By joining [facility] FollowMyHealth portal, you will also be able to view your health information using other applications (apps) compatible with our system.

## 2023-04-12 NOTE — ED PROVIDER NOTE - CLINICAL SUMMARY MEDICAL DECISION MAKING FREE TEXT BOX
Patient with pneumonia and February of this year complaining of productive cough with green sputum and right lateral chest discomfort for few days.  Patient denies fevers chills short breath abdominal pain nausea vomiting edema calf pain travel.  No history of PE or DVT.    Plan EKG chest x-ray labs  Differential including but not limited to MI PE pneumonia pneumothorax pneumonia bronchitis Flu COVID

## 2023-04-12 NOTE — ED PROVIDER NOTE - OBJECTIVE STATEMENT
37-year-old female with past medical history presents with complaint of cough since yesterday and right-sided rib pain since last week.  Patient states that she has had mild occasional right lateral rib pain since last week however started having cough productive of green sputum yesterday and rib pain got worse since with the cough.  Patient states that she was seen in the ED for cough and fever on 2/27, was diagnosed with bibasilar pneumonia and discharged home on Zithromax with improvement.  Patient states that she did not follow-up with the PCP and does not have a PCP at the present.  Denies fever, chest pain, shortness of breath, nausea/vomiting, abdominal pain, urinary symptoms, calf pain/swelling, recent travel/immobilization, known sick contacts, smoking/ocp use, history of DVT/PE, trauma or other symptoms.

## 2023-04-12 NOTE — ED PROVIDER NOTE - NSFOLLOWUPINSTRUCTIONS_ED_ALL_ED_FT
Follow-up with PCP for reevaluation, ongoing care and treatment.  Rest, drink plenty of fluids.  Take cough medication as prescribed and full course of antibiotics as directed.  Take Motrin 600 mg every 6 hours with food as needed for pain.  If having worsening of symptoms or related symptoms, return to the ER immediately.    Upper Respiratory Infection, Adult  An upper respiratory infection (URI) affects the nose, throat, and upper airways that lead to the lungs. The most common type of URI is often called the common cold. URIs usually get better on their own, without medical treatment.    What are the causes?  A URI is caused by a germ (virus). You may catch these germs by:  Breathing in droplets from an infected person's cough or sneeze.  Touching something that has the germ on it (is contaminated) and then touching your mouth, nose, or eyes.  What increases the risk?  You are more likely to get a URI if:  You are very young or very old.  You have close contact with others, such as at work, school, or a health care facility.  You smoke.  You have long-term (chronic) heart or lung disease.  You have a weakened disease-fighting system (immune system).  You have nasal allergies or asthma.  You have a lot of stress.  You have poor nutrition.  What are the signs or symptoms?  Runny or stuffy (congested) nose.  Cough.  Sneezing.  Sore throat.  Headache.  Feeling tired (fatigue).  Fever.  Not wanting to eat as much as usual.  Pain in your forehead, behind your eyes, and over your cheekbones (sinus pain).  Muscle aches.  Redness or irritation of the eyes.  Pressure in the ears or face.  How is this treated?  URIs usually get better on their own within 7–10 days. Medicines cannot cure URIs, but your doctor may recommend certain medicines to help relieve symptoms, such as:  Over-the-counter cold medicines.  Medicines to reduce coughing (cough suppressants). Coughing is a type of defense against infection that helps to clear the nose, throat, windpipe, and lungs (respiratory system). Take these medicines only as told by your doctor.  Medicines to lower your fever.  Follow these instructions at home:  Activity    Rest as needed.  If you have a fever, stay home from work or school until your fever is gone, or until your doctor says you may return to work or school.  You should stay home until you cannot spread the infection anymore (you are not contagious).  Your doctor may have you wear a face mask so you have less risk of spreading the infection.  Relieving symptoms    Rinse your mouth often with salt water. To make salt water, dissolve ½–1 tsp (3–6 g) of salt in 1 cup (237 mL) of warm water.  Use a cool-mist humidifier to add moisture to the air. This can help you breathe more easily.  Eating and drinking    Three cups showing dark yellow, yellow, and pale yellow pee.  Drink enough fluid to keep your pee (urine) pale yellow.  Eat soups and other clear broths.  General instructions    A sign showing that a person should not smoke.  Take over-the-counter and prescription medicines only as told by your doctor.  Do not smoke or use any products that contain nicotine or tobacco. If you need help quitting, ask your doctor.  Avoid being where people are smoking (avoid secondhand smoke).  Stay up to date on all your shots (immunizations), and get the flu shot every year.  Keep all follow-up visits.  How to prevent the spread of infection to others    Washing hands with soap and water.  Wash your hands with soap and water for at least 20 seconds. If you cannot use soap and water, use hand .  Avoid touching your mouth, face, eyes, or nose.  Cough or sneeze into a tissue or your sleeve or elbow. Do not cough or sneeze into your hand or into the air.  Contact a doctor if:  You are getting worse, not better.  You have any of these:  A fever or chills.  Brown or red mucus in your nose.  Yellow or brown fluid (discharge)coming from your nose.  Pain in your face, especially when you bend forward.  Swollen neck glands.  Pain when you swallow.  White areas in the back of your throat.  Get help right away if:  You have shortness of breath that gets worse.  You have very bad or constant:  Headache.  Ear pain.  Pain in your forehead, behind your eyes, and over your cheekbones (sinus pain).  Chest pain.  You have long-lasting (chronic) lung disease along with any of these:  Making high-pitched whistling sounds when you breathe, most often when you breathe out (wheezing).  Long-lasting cough (more than 14 days).  Coughing up blood.  A change in your usual mucus.  You have a stiff neck.  You have changes in your:  Vision.  Hearing.  Thinking.  Mood.  These symptoms may be an emergency. Get help right away. Call 911.  Do not wait to see if the symptoms will go away.  Do not drive yourself to the hospital.  Summary  An upper respiratory infection (URI) is caused by a germ (virus). The most common type of URI is often called the common cold.  URIs usually get better within 7–10 days.  Take over-the-counter and prescription medicines only as told by your doctor.  This information is not intended to replace advice given to you by your health care provider. Make sure you discuss any questions you have with your health care provider.

## 2023-04-12 NOTE — ED PROVIDER NOTE - PROGRESS NOTE DETAILS
Reevaluated patient at bedside.  Discussed the results of all diagnostic testing in ED and copies of all reports given.  Advised will rx z-pack, tessalon perles, nsaids prn pain, fluids, f/u pcp. An opportunity to ask questions was given.  Discussed the importance of prompt, close medical follow-up.  Patient will return with any changes, concerns or persistent / worsening symptoms.  Understanding of all instructions verbalized.

## 2023-04-12 NOTE — ED PROVIDER NOTE - DIFFERENTIAL DIAGNOSIS
Differential including but not limited to MI PE pneumonia pneumothorax pneumonia bronchitis Flu COVID Differential Diagnosis

## 2023-04-12 NOTE — ED ADULT NURSE NOTE - CADM POA URETHRAL CATHETER
Render Risk Assessment In Note?: no Detail Level: Simple Comment: Trauma possible due to “running into something” per patient. \\nPossibly basal cell carcinoma, patient elected to monitor area. No

## 2023-04-19 ENCOUNTER — EMERGENCY (EMERGENCY)
Facility: HOSPITAL | Age: 38
LOS: 1 days | Discharge: HOME | End: 2023-04-19
Attending: EMERGENCY MEDICINE | Admitting: EMERGENCY MEDICINE
Payer: COMMERCIAL

## 2023-04-19 VITALS
HEART RATE: 78 BPM | SYSTOLIC BLOOD PRESSURE: 154 MMHG | RESPIRATION RATE: 15 BRPM | TEMPERATURE: 98 F | DIASTOLIC BLOOD PRESSURE: 97 MMHG | OXYGEN SATURATION: 97 %

## 2023-04-19 VITALS
TEMPERATURE: 99 F | DIASTOLIC BLOOD PRESSURE: 86 MMHG | SYSTOLIC BLOOD PRESSURE: 142 MMHG | HEIGHT: 67 IN | WEIGHT: 231.49 LBS | RESPIRATION RATE: 16 BRPM | OXYGEN SATURATION: 98 % | HEART RATE: 88 BPM

## 2023-04-19 LAB — HCG UR QL: NEGATIVE — SIGNIFICANT CHANGE UP

## 2023-04-19 PROCEDURE — 71046 X-RAY EXAM CHEST 2 VIEWS: CPT | Mod: 26

## 2023-04-19 PROCEDURE — 99283 EMERGENCY DEPT VISIT LOW MDM: CPT | Mod: 25

## 2023-04-19 PROCEDURE — 81025 URINE PREGNANCY TEST: CPT

## 2023-04-19 PROCEDURE — 71046 X-RAY EXAM CHEST 2 VIEWS: CPT

## 2023-04-19 PROCEDURE — 99284 EMERGENCY DEPT VISIT MOD MDM: CPT

## 2023-04-19 PROCEDURE — 96372 THER/PROPH/DIAG INJ SC/IM: CPT

## 2023-04-19 RX ORDER — KETOROLAC TROMETHAMINE 30 MG/ML
30 SYRINGE (ML) INJECTION ONCE
Refills: 0 | Status: DISCONTINUED | OUTPATIENT
Start: 2023-04-19 | End: 2023-04-19

## 2023-04-19 RX ORDER — FLUTICASONE PROPIONATE 50 MCG
1 SPRAY, SUSPENSION NASAL
Qty: 1 | Refills: 0
Start: 2023-04-19 | End: 2023-04-28

## 2023-04-19 RX ORDER — LIDOCAINE 4 G/100G
1 CREAM TOPICAL ONCE
Refills: 0 | Status: COMPLETED | OUTPATIENT
Start: 2023-04-19 | End: 2023-04-19

## 2023-04-19 RX ADMIN — LIDOCAINE 1 PATCH: 4 CREAM TOPICAL at 11:43

## 2023-04-19 RX ADMIN — Medication 30 MILLIGRAM(S): at 11:43

## 2023-04-19 RX ADMIN — Medication 30 MILLIGRAM(S): at 12:13

## 2023-04-19 NOTE — ED PROVIDER NOTE - NSFOLLOWUPINSTRUCTIONS_ED_ALL_ED_FT
Augmentin twice a day  Robitussin cough syrup twice a day as needed  Flonase nasal spray daily, may also buy over-the-counter  Recommend antihistamine over-the-counter such as Zyrtec or Allegra  Naprosyn twice a day with food for pain and inflammation  Salonpas lidocaine patches over-the-counter, 12 hours on 12 hours off  Follow-up with primary care doctor      Acute Bronchitis, Adult  A comparison between normal and swollen airways, or bronchi, in the lungs.  Acute bronchitis is sudden inflammation of the main airways (bronchi) that come off the windpipe (trachea) in the lungs. The swelling causes the airways to get smaller and make more mucus than normal. This can make it hard to breathe and can cause coughing or noisy breathing (wheezing).    Acute bronchitis may last several weeks. The cough may last longer. Allergies, asthma, and exposure to smoke may make the condition worse.    What are the causes?  This condition can be caused by germs and by substances that irritate the lungs, including:  Cold and flu viruses. The most common cause of this condition is the virus that causes the common cold.  Bacteria. This is less common.  Breathing in substances that irritate the lungs, including:  Smoke from cigarettes and other forms of tobacco.  Dust and pollen.  Fumes from household cleaning products, gases, or burned fuel.  Indoor or outdoor air pollution.  What increases the risk?  The following factors may make you more likely to develop this condition:  A weak body's defense system, also called the immune system.  A condition that affects your lungs and breathing, such as asthma.  What are the signs or symptoms?  Common symptoms of this condition include:  Coughing. This may bring up clear, yellow, or green mucus from your lungs (sputum).  Wheezing.  Runny or stuffy nose.  Having too much mucus in your lungs (chest congestion).  Shortness of breath.  Aches and pains, including sore throat or chest.  How is this diagnosed?  This condition is usually diagnosed based on:  Your symptoms and medical history.  A physical exam.  You may also have other tests, including tests to rule out other conditions, such as pneumonia. These tests include:  A test of lung function.  Test of a mucus sample to look for the presence of bacteria.  Tests to check the oxygen level in your blood.  Blood tests.  Chest X-ray.  How is this treated?  Most cases of acute bronchitis clear up over time without treatment. Your health care provider may recommend:  Drinking more fluids to help thin your mucus so it is easier to cough up.  Taking inhaled medicine (inhaler) to improve air flow in and out of your lungs.  Using a vaporizer or a humidifier. These are machines that add water to the air to help you breathe better.  Taking a medicine that thins mucus and clears congestion (expectorant).  Taking a medicine that prevents or stops coughing (cough suppressant).  It is notcommon to take an antibiotic medicine for this condition.    Follow these instructions at home:  A do not smoke cigarettes sign.  Take over-the-counter and prescription medicines only as told by your health care provider.  Use an inhaler, vaporizer, or humidifier as told by your health care provider.  Take two teaspoons (10 mL) of honey at bedtime to lessen coughing at night.  Drink enough fluid to keep your urine pale yellow.  Do not use any products that contain nicotine or tobacco. These products include cigarettes, chewing tobacco, and vaping devices, such as e-cigarettes. If you need help quitting, ask your health care provider.  Get plenty of rest.  Return to your normal activities as told by your health care provider. Ask your health care provider what activities are safe for you.  Keep all follow-up visits. This is important.  How is this prevented?  Washing hands with soap and water.  To lower your risk of getting this condition again:  Wash your hands often with soap and water for at least 20 seconds. If soap and water are not available, use hand .  Avoid contact with people who have cold symptoms.  Try not to touch your mouth, nose, or eyes with your hands.  Avoid breathing in smoke or chemical fumes. Breathing smoke or chemical fumes will make your condition worse.  Get the flu shot every year.  Contact a health care provider if:  Your symptoms do not improve after 2 weeks.  You have trouble coughing up the mucus.  Your cough keeps you awake at night.  You have a fever.  Get help right away if you:  Cough up blood.  Feel pain in your chest.  Have severe shortness of breath.  Faint or keep feeling like you are going to faint.  Have a severe headache.  Have a fever or chills that get worse.  These symptoms may represent a serious problem that is an emergency. Do not wait to see if the symptoms will go away. Get medical help right away. Call your local emergency services (911 in the [place] Do not drive yourself to the hospital.    Summary  Acute bronchitis is inflammation of the main airways (bronchi) that come off the windpipe (trachea) in the lungs. The swelling causes the airways to get smaller and make more mucus than normal.  Drinking more fluids can help thin your mucus so it is easier to cough up.  Take over-the-counter and prescription medicines only as told by your health care provider.  Do not use any products that contain nicotine or tobacco. These products include cigarettes, chewing tobacco, and vaping devices, such as e-cigarettes. If you need help quitting, ask your health care provider.  Contact a health care provider if your symptoms do not improve after 2 weeks.  This information is not intended to replace advice given to you by your health care provider. Make sure you discuss any questions you have with your health care provider.

## 2023-04-19 NOTE — ED PROVIDER NOTE - NS ED ATTENDING STATEMENT MOD
This was a shared visit with the ELIN. I reviewed and verified the documentation and independently performed the documented:

## 2023-04-19 NOTE — ED PROVIDER NOTE - CARE PLAN
Principal Discharge DX:	Acute bronchitis  Secondary Diagnosis:	Cough  Secondary Diagnosis:	Upper back pain   1

## 2023-04-19 NOTE — ED PROVIDER NOTE - CLINICAL SUMMARY MEDICAL DECISION MAKING FREE TEXT BOX
37-year-old female with history of recent pneumonia cough with yellow phlegm seen in ER last week for same and had x-ray which showed improving infiltrates.  Was prescribed Z-Misbah.  States she has continued cough and right upper back pain.  Denies fever chest pain shortness of breath nausea vomiting or other symptom.  Was prescribed Tessalon last week which she says did not help.  Does not remember the name of her PCP.  Denies history of smoking.  Has 4-year-old child at home who has been sick recently.  Physical exam vital signs stable afebrile no distress heart S1-S2 regular rhythm lungs clear to auscultation there is no rib tenderness or step-off.  Abdomen soft nontender.  Legs no calf swelling or tenderness no cords or Homans.  Impression is cough with back pain rule out pneumonia versus musculoskeletal pain.  Plan is chest x-ray.  If negative will prescribe NSAIDs and cough medicine.  Will need PCP/pulmonary follow-up.

## 2023-04-19 NOTE — ED PROVIDER NOTE - DIFFERENTIAL DIAGNOSIS
differentials include but not limited to pneumonia, bronchitis, viral illness, post viral cough, muscle strain. do not suspect PE. d-dimer neg last week. no tachycardia, hypoxia, or tachypnea Differential Diagnosis

## 2023-04-19 NOTE — ED ADULT NURSE NOTE - OBJECTIVE STATEMENT
patient has c/o cough and right back pain when cough, patient has seen by MD last week and took antibiotic, continue cough and developed new back pain, comfort measure provided, callbell within reach, reinforced surrounding and plan of care will continue to monitor.

## 2023-04-19 NOTE — ED PROVIDER NOTE - PROGRESS NOTE DETAILS
Patient stable.  Overall improved after intramuscular Toradol and lidocaine patch.  Chest x-ray unchanged from 1 week ago.  Chest x-ray 1 week ago showed improved bibasilar infiltrate.  Recently was prescribed Zithromax and just finished a day ago.   will also prescribe Augmentin for broad spectrum coverage.   No shortness of breath.  Had D-dimer last week which was negative.  Do not suspect pulmonary embolism.  No hypoxia, tachycardia, or tachypnea.  No leg pain or swelling.  Supportive care discussed.  Recommend antihistamine over-the-counter, nasal steroid, will prescribe Robitussin for cough.  Recently was on Tessalon Perles without much improvement of cough.  Supportive care discussed.  Recommend close follow-up with primary care doctor, referral provided if needed

## 2023-04-19 NOTE — ED PROVIDER NOTE - PATIENT PORTAL LINK FT
No
You can access the FollowMyHealth Patient Portal offered by [facility] by registering at the following website: [email or web address]. By joining [facility]s FollowMyHealth portal, you will also be able to view your health information using other applications (apps) compatible with our system.

## 2023-04-19 NOTE — ED PROVIDER NOTE - OBJECTIVE STATEMENT
37-year-old female with history of hypertension presents with continued pain to right upper back.  Patient started coughing last week.  Was seen here at this emergency room.  Had blood work, chest x-ray, and RVP.  Continue test positive for enterovirus/rhinovirus that she was diagnosed with in February.  Chest x-ray showed improved bibasilar infiltrate when compared to chest x-ray performed in February.  Was prescribed Tessalon pearls and Zithromax.  States cough has continued, slightly improved.  Reports right upper back pain has continued and possibly slightly worse.  Has not been taking anything over-the-counter for pain or symptoms as she did not want to interfere with taking Zithromax or Tessalon Perles.  Denies any fever or chills.  Denies shortness of breath.  Denies abdominal pain, nausea, vomiting, diarrhea.  No leg pain or swelling.  No history of DVT or PE.  PCP "forgot name"

## 2023-04-19 NOTE — ED PROVIDER NOTE - CARE PROVIDER_API CALL
Nika Plaza)  Internal Medicine  [address] [place] [address]  Phone: [phone]  Fax: [phone]  Follow Up Time: 1-3 Days

## 2023-08-26 ENCOUNTER — APPOINTMENT (OUTPATIENT)
Dept: INTERNAL MEDICINE | Facility: CLINIC | Age: 38
End: 2023-08-26

## 2023-11-20 ENCOUNTER — EMERGENCY (EMERGENCY)
Facility: HOSPITAL | Age: 38
LOS: 1 days | Discharge: HOME | End: 2023-11-20
Attending: EMERGENCY MEDICINE | Admitting: EMERGENCY MEDICINE
Payer: COMMERCIAL

## 2023-11-20 VITALS
HEART RATE: 78 BPM | SYSTOLIC BLOOD PRESSURE: 160 MMHG | HEIGHT: 67 IN | OXYGEN SATURATION: 99 % | WEIGHT: 244.93 LBS | RESPIRATION RATE: 14 BRPM | DIASTOLIC BLOOD PRESSURE: 104 MMHG | TEMPERATURE: 98 F

## 2023-11-20 VITALS
OXYGEN SATURATION: 99 % | HEART RATE: 75 BPM | TEMPERATURE: 98 F | RESPIRATION RATE: 15 BRPM | SYSTOLIC BLOOD PRESSURE: 152 MMHG | DIASTOLIC BLOOD PRESSURE: 90 MMHG

## 2023-11-20 LAB
ALBUMIN SERPL ELPH-MCNC: 3.2 G/DL — LOW (ref 3.3–5)
ALBUMIN SERPL ELPH-MCNC: 3.2 G/DL — LOW (ref 3.3–5)
ALP SERPL-CCNC: 96 U/L — SIGNIFICANT CHANGE UP (ref 30–120)
ALP SERPL-CCNC: 96 U/L — SIGNIFICANT CHANGE UP (ref 30–120)
ALT FLD-CCNC: 18 U/L — SIGNIFICANT CHANGE UP (ref 10–60)
ALT FLD-CCNC: 18 U/L — SIGNIFICANT CHANGE UP (ref 10–60)
ANION GAP SERPL CALC-SCNC: 9 MMOL/L — SIGNIFICANT CHANGE UP (ref 5–17)
ANION GAP SERPL CALC-SCNC: 9 MMOL/L — SIGNIFICANT CHANGE UP (ref 5–17)
AST SERPL-CCNC: 18 U/L — SIGNIFICANT CHANGE UP (ref 10–40)
AST SERPL-CCNC: 18 U/L — SIGNIFICANT CHANGE UP (ref 10–40)
BASOPHILS # BLD AUTO: 0.03 K/UL — SIGNIFICANT CHANGE UP (ref 0–0.2)
BASOPHILS # BLD AUTO: 0.03 K/UL — SIGNIFICANT CHANGE UP (ref 0–0.2)
BASOPHILS NFR BLD AUTO: 0.3 % — SIGNIFICANT CHANGE UP (ref 0–2)
BASOPHILS NFR BLD AUTO: 0.3 % — SIGNIFICANT CHANGE UP (ref 0–2)
BILIRUB SERPL-MCNC: 0.1 MG/DL — LOW (ref 0.2–1.2)
BILIRUB SERPL-MCNC: 0.1 MG/DL — LOW (ref 0.2–1.2)
BUN SERPL-MCNC: 10 MG/DL — SIGNIFICANT CHANGE UP (ref 7–23)
BUN SERPL-MCNC: 10 MG/DL — SIGNIFICANT CHANGE UP (ref 7–23)
CALCIUM SERPL-MCNC: 8.4 MG/DL — SIGNIFICANT CHANGE UP (ref 8.4–10.5)
CALCIUM SERPL-MCNC: 8.4 MG/DL — SIGNIFICANT CHANGE UP (ref 8.4–10.5)
CHLORIDE SERPL-SCNC: 103 MMOL/L — SIGNIFICANT CHANGE UP (ref 96–108)
CHLORIDE SERPL-SCNC: 103 MMOL/L — SIGNIFICANT CHANGE UP (ref 96–108)
CO2 SERPL-SCNC: 28 MMOL/L — SIGNIFICANT CHANGE UP (ref 22–31)
CO2 SERPL-SCNC: 28 MMOL/L — SIGNIFICANT CHANGE UP (ref 22–31)
CREAT SERPL-MCNC: 0.75 MG/DL — SIGNIFICANT CHANGE UP (ref 0.5–1.3)
CREAT SERPL-MCNC: 0.75 MG/DL — SIGNIFICANT CHANGE UP (ref 0.5–1.3)
D DIMER BLD IA.RAPID-MCNC: <150 NG/ML DDU — SIGNIFICANT CHANGE UP
D DIMER BLD IA.RAPID-MCNC: <150 NG/ML DDU — SIGNIFICANT CHANGE UP
EGFR: 105 ML/MIN/1.73M2 — SIGNIFICANT CHANGE UP
EGFR: 105 ML/MIN/1.73M2 — SIGNIFICANT CHANGE UP
EOSINOPHIL # BLD AUTO: 0.2 K/UL — SIGNIFICANT CHANGE UP (ref 0–0.5)
EOSINOPHIL # BLD AUTO: 0.2 K/UL — SIGNIFICANT CHANGE UP (ref 0–0.5)
EOSINOPHIL NFR BLD AUTO: 2.3 % — SIGNIFICANT CHANGE UP (ref 0–6)
EOSINOPHIL NFR BLD AUTO: 2.3 % — SIGNIFICANT CHANGE UP (ref 0–6)
GLUCOSE SERPL-MCNC: 144 MG/DL — HIGH (ref 70–99)
GLUCOSE SERPL-MCNC: 144 MG/DL — HIGH (ref 70–99)
HCG SERPL-ACNC: <1 MIU/ML — SIGNIFICANT CHANGE UP
HCG SERPL-ACNC: <1 MIU/ML — SIGNIFICANT CHANGE UP
HCT VFR BLD CALC: 37.6 % — SIGNIFICANT CHANGE UP (ref 34.5–45)
HCT VFR BLD CALC: 37.6 % — SIGNIFICANT CHANGE UP (ref 34.5–45)
HGB BLD-MCNC: 11.2 G/DL — LOW (ref 11.5–15.5)
HGB BLD-MCNC: 11.2 G/DL — LOW (ref 11.5–15.5)
IMM GRANULOCYTES NFR BLD AUTO: 0.2 % — SIGNIFICANT CHANGE UP (ref 0–0.9)
IMM GRANULOCYTES NFR BLD AUTO: 0.2 % — SIGNIFICANT CHANGE UP (ref 0–0.9)
LYMPHOCYTES # BLD AUTO: 2.34 K/UL — SIGNIFICANT CHANGE UP (ref 1–3.3)
LYMPHOCYTES # BLD AUTO: 2.34 K/UL — SIGNIFICANT CHANGE UP (ref 1–3.3)
LYMPHOCYTES # BLD AUTO: 26.4 % — SIGNIFICANT CHANGE UP (ref 13–44)
LYMPHOCYTES # BLD AUTO: 26.4 % — SIGNIFICANT CHANGE UP (ref 13–44)
MCHC RBC-ENTMCNC: 22.6 PG — LOW (ref 27–34)
MCHC RBC-ENTMCNC: 22.6 PG — LOW (ref 27–34)
MCHC RBC-ENTMCNC: 29.8 GM/DL — LOW (ref 32–36)
MCHC RBC-ENTMCNC: 29.8 GM/DL — LOW (ref 32–36)
MCV RBC AUTO: 75.8 FL — LOW (ref 80–100)
MCV RBC AUTO: 75.8 FL — LOW (ref 80–100)
MONOCYTES # BLD AUTO: 0.72 K/UL — SIGNIFICANT CHANGE UP (ref 0–0.9)
MONOCYTES # BLD AUTO: 0.72 K/UL — SIGNIFICANT CHANGE UP (ref 0–0.9)
MONOCYTES NFR BLD AUTO: 8.1 % — SIGNIFICANT CHANGE UP (ref 2–14)
MONOCYTES NFR BLD AUTO: 8.1 % — SIGNIFICANT CHANGE UP (ref 2–14)
NEUTROPHILS # BLD AUTO: 5.56 K/UL — SIGNIFICANT CHANGE UP (ref 1.8–7.4)
NEUTROPHILS # BLD AUTO: 5.56 K/UL — SIGNIFICANT CHANGE UP (ref 1.8–7.4)
NEUTROPHILS NFR BLD AUTO: 62.7 % — SIGNIFICANT CHANGE UP (ref 43–77)
NEUTROPHILS NFR BLD AUTO: 62.7 % — SIGNIFICANT CHANGE UP (ref 43–77)
NRBC # BLD: 0 /100 WBCS — SIGNIFICANT CHANGE UP (ref 0–0)
NRBC # BLD: 0 /100 WBCS — SIGNIFICANT CHANGE UP (ref 0–0)
NT-PROBNP SERPL-SCNC: 27 PG/ML — SIGNIFICANT CHANGE UP (ref 0–125)
NT-PROBNP SERPL-SCNC: 27 PG/ML — SIGNIFICANT CHANGE UP (ref 0–125)
PLATELET # BLD AUTO: 353 K/UL — SIGNIFICANT CHANGE UP (ref 150–400)
PLATELET # BLD AUTO: 353 K/UL — SIGNIFICANT CHANGE UP (ref 150–400)
POTASSIUM SERPL-MCNC: 3.5 MMOL/L — SIGNIFICANT CHANGE UP (ref 3.5–5.3)
POTASSIUM SERPL-MCNC: 3.5 MMOL/L — SIGNIFICANT CHANGE UP (ref 3.5–5.3)
POTASSIUM SERPL-SCNC: 3.5 MMOL/L — SIGNIFICANT CHANGE UP (ref 3.5–5.3)
POTASSIUM SERPL-SCNC: 3.5 MMOL/L — SIGNIFICANT CHANGE UP (ref 3.5–5.3)
PROT SERPL-MCNC: 7.2 G/DL — SIGNIFICANT CHANGE UP (ref 6–8.3)
PROT SERPL-MCNC: 7.2 G/DL — SIGNIFICANT CHANGE UP (ref 6–8.3)
RBC # BLD: 4.96 M/UL — SIGNIFICANT CHANGE UP (ref 3.8–5.2)
RBC # BLD: 4.96 M/UL — SIGNIFICANT CHANGE UP (ref 3.8–5.2)
RBC # FLD: 15.2 % — HIGH (ref 10.3–14.5)
RBC # FLD: 15.2 % — HIGH (ref 10.3–14.5)
SODIUM SERPL-SCNC: 140 MMOL/L — SIGNIFICANT CHANGE UP (ref 135–145)
SODIUM SERPL-SCNC: 140 MMOL/L — SIGNIFICANT CHANGE UP (ref 135–145)
TROPONIN I, HIGH SENSITIVITY RESULT: 5.3 NG/L — SIGNIFICANT CHANGE UP
TROPONIN I, HIGH SENSITIVITY RESULT: 5.3 NG/L — SIGNIFICANT CHANGE UP
WBC # BLD: 8.87 K/UL — SIGNIFICANT CHANGE UP (ref 3.8–10.5)
WBC # BLD: 8.87 K/UL — SIGNIFICANT CHANGE UP (ref 3.8–10.5)
WBC # FLD AUTO: 8.87 K/UL — SIGNIFICANT CHANGE UP (ref 3.8–10.5)
WBC # FLD AUTO: 8.87 K/UL — SIGNIFICANT CHANGE UP (ref 3.8–10.5)

## 2023-11-20 PROCEDURE — 93005 ELECTROCARDIOGRAM TRACING: CPT

## 2023-11-20 PROCEDURE — 99285 EMERGENCY DEPT VISIT HI MDM: CPT

## 2023-11-20 PROCEDURE — 71046 X-RAY EXAM CHEST 2 VIEWS: CPT

## 2023-11-20 PROCEDURE — 93010 ELECTROCARDIOGRAM REPORT: CPT

## 2023-11-20 PROCEDURE — 85379 FIBRIN DEGRADATION QUANT: CPT

## 2023-11-20 PROCEDURE — 85025 COMPLETE CBC W/AUTO DIFF WBC: CPT

## 2023-11-20 PROCEDURE — 70498 CT ANGIOGRAPHY NECK: CPT | Mod: MA

## 2023-11-20 PROCEDURE — 96365 THER/PROPH/DIAG IV INF INIT: CPT | Mod: XU

## 2023-11-20 PROCEDURE — 80053 COMPREHEN METABOLIC PANEL: CPT

## 2023-11-20 PROCEDURE — 99285 EMERGENCY DEPT VISIT HI MDM: CPT | Mod: 25

## 2023-11-20 PROCEDURE — 70450 CT HEAD/BRAIN W/O DYE: CPT | Mod: MA

## 2023-11-20 PROCEDURE — 71046 X-RAY EXAM CHEST 2 VIEWS: CPT | Mod: 26

## 2023-11-20 PROCEDURE — 70496 CT ANGIOGRAPHY HEAD: CPT | Mod: MA

## 2023-11-20 PROCEDURE — 70498 CT ANGIOGRAPHY NECK: CPT | Mod: 26,MA

## 2023-11-20 PROCEDURE — 70496 CT ANGIOGRAPHY HEAD: CPT | Mod: 26,MA

## 2023-11-20 PROCEDURE — 83880 ASSAY OF NATRIURETIC PEPTIDE: CPT

## 2023-11-20 PROCEDURE — 84484 ASSAY OF TROPONIN QUANT: CPT

## 2023-11-20 PROCEDURE — 36415 COLL VENOUS BLD VENIPUNCTURE: CPT

## 2023-11-20 PROCEDURE — 84702 CHORIONIC GONADOTROPIN TEST: CPT

## 2023-11-20 PROCEDURE — 70450 CT HEAD/BRAIN W/O DYE: CPT | Mod: 26,MA,59

## 2023-11-20 RX ORDER — AMLODIPINE BESYLATE 2.5 MG/1
5 TABLET ORAL ONCE
Refills: 0 | Status: COMPLETED | OUTPATIENT
Start: 2023-11-20 | End: 2023-11-20

## 2023-11-20 RX ORDER — AMLODIPINE BESYLATE 2.5 MG/1
1 TABLET ORAL
Qty: 20 | Refills: 0
Start: 2023-11-20 | End: 2023-12-09

## 2023-11-20 RX ORDER — KETOROLAC TROMETHAMINE 30 MG/ML
30 SYRINGE (ML) INJECTION ONCE
Refills: 0 | Status: DISCONTINUED | OUTPATIENT
Start: 2023-11-20 | End: 2023-11-20

## 2023-11-20 RX ORDER — METOCLOPRAMIDE HCL 10 MG
10 TABLET ORAL ONCE
Refills: 0 | Status: COMPLETED | OUTPATIENT
Start: 2023-11-20 | End: 2023-11-20

## 2023-11-20 RX ADMIN — Medication 10 MILLIGRAM(S): at 21:03

## 2023-11-20 RX ADMIN — AMLODIPINE BESYLATE 5 MILLIGRAM(S): 2.5 TABLET ORAL at 22:52

## 2023-11-20 RX ADMIN — Medication 104 MILLIGRAM(S): at 18:52

## 2023-11-20 NOTE — ED PROVIDER NOTE - CLINICAL SUMMARY MEDICAL DECISION MAKING FREE TEXT BOX
Patient complaining of gradual onset of generalized headache today.  Patient had someone at her workplace check her blood pressure was noted to be elevated approximately 169/96 and then a repeat later was 176/111 so she decided to come to the ER.  Patient relates mild shortness of breath/dyspnea on exertion for the past week.  Patient reports recent COVID infection.  No fevers chills chest pain nausea vomiting abdominal pain focal weakness or numbness rash neck stiffness or photophobia edema calf pain or travel.    Plan EKG chest x-ray CT head labs Toradol Reglan    Differential including but not limited to ICH hypertensive urgency migraine or other headache disorder MI CHF PE pneumothorax pneumonia effusion

## 2023-11-20 NOTE — ED PROVIDER NOTE - PHYSICAL EXAMINATION
Gen: Well appearing in NAD.   Eyes: PERRLA, EOMI   ENT: oral mucosa moist   Head: atraumatic  Heart: s1/s2, RRR  Lung: CTA b/l, no wheezing/rhonchi or rales.   Abd: soft, NT/ND, no rebound or guarding, NCVAT  Msk: no pedal edema or calf pain, no C-spine exam or mid spinal Tenderness to palpation. No nuchal rigidity   Neuro: AAO x3, patient moving all extremity equally, no focal neuro deficits noted  Skin: Normal for race. No rashes  Psych: Alert and oriented

## 2023-11-20 NOTE — ED ADULT NURSE NOTE - OBJECTIVE STATEMENT
pt comes to ed c/o central headache since 1pm today. pt states she was at work when the headache started, had her BP checked and was told it was elevated. hx of covid 3 weeks ago has been come dyspnea on exertion. Pt denies chest pain, fever chills, nausea vomiting, visual changes, extremity weakness, photophobia, neck pain, head trauma, recent travel or prolonged immobilization or all other complaints

## 2023-11-20 NOTE — ED PROVIDER NOTE - PROGRESS NOTE DETAILS
NATAN Fam:  Labs reviewed CT angio head/neck  and Noncon head CT results reviewed.  No acute findings.  Patient incidentally noted to have a 3.5 cm distal ascending aortic borderline ecstatic.  She was informed of these results and will follow them up with her PCP.  Repeat blood pressure was 152/90.  Will start amlodipine 5 mg and have patient follow-up with her PCP NATAN Fam:  Labs reviewed CT angio head/neck  and Noncon head CT results reviewed.  No acute findings.  Patient incidentally noted to have borderline ecstatic 3.5 cm distal ascending aortic .  She was informed of these results and will follow them up with her PCP.  Repeat blood pressure was 152/90.  Will start amlodipine 5 mg and have patient follow-up with her PCP

## 2023-11-20 NOTE — ED ADULT NURSE NOTE - SKIN CAPILLARY REFILL
06/07/22       Mame Olmsteadniewski  [address] [place] [id number]       As you know, the purpose of Conditions Management is to give you tips that will help you and your doctor with your health.     I have included information for your review. If you'd like to go over it together, please call me at     [phone]   8:00 AM to 4:30 PM CST   (Monday-Friday)     If I’m not able to take your call, you can leave a private voicemail message and I will call you back within one business day.    Sincerely,     Roxie Tinoco, RN  Specialty Care Manager  Conditions Management  [facility]    Attached Resources:  Exercise for a Healthier Heart  Limiting Fluids and Cardiac Medications  Heart Failure Travel Concerns  Women and Heart Disease: Understanding the Risks  
2 seconds or less

## 2023-11-20 NOTE — ED PROVIDER NOTE - CARE PROVIDER_API CALL
Carina Humphrey  [facility] Medicine  [address] [place] [id number]  Phone: [phone]  Fax: [phone]  Follow Up Time:

## 2023-11-20 NOTE — ED PROVIDER NOTE - PATIENT PORTAL LINK FT
You can access the FollowMyHealth Patient Portal offered by [facility] by registering at the following website: [email or web address]. By joining [facility]’s FollowMyHealth portal, you will also be able to view your health information using other applications (apps) compatible with our system.

## 2023-11-20 NOTE — ED PROVIDER NOTE - DIFFERENTIAL DIAGNOSIS
Differential including but not limited to ICH hypertensive urgency migraine or other headache disorder MI CHF PE pneumothorax pneumonia effusion Differential Diagnosis

## 2023-11-20 NOTE — ED PROVIDER NOTE - OBJECTIVE STATEMENT
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  Patient reports that she had COVID approximately 3 weeks ago.  She is also reporting mild shortness of breath with exertion x1 week, which she is attributing to weight gain over the past year.  She denies any chest pain, fever chills, nausea vomiting, visual changes, extremity weakness, photophobia, neck pain, head trauma, recent travel or prolonged immobilization or all other complaints

## 2023-11-20 NOTE — ED PROVIDER NOTE - NSFOLLOWUPINSTRUCTIONS_ED_ALL_ED_FT
Follow up with your primary care physician within 2-3 days. Take the copy of your test results you were given in the emergency room for your doctor to review.     Take the prescribed medication as directed     Stay hydrated    Return to the ER if your symptoms worsen or for any other medical emergencies  ************    Hypertension    Hypertension, commonly called high blood pressure, is when the force of blood pumping through your arteries is too strong. Hypertension forces your heart to work harder to pump blood. Your arteries may become narrow or stiff. Having untreated or uncontrolled hypertension for a long period of time can cause heart attack, stroke, kidney disease, and other problems. If started on a medication, take exactly as prescribed by your health care professional. Maintain a healthy lifestyle and follow up with your primary care physician.    SEEK IMMEDIATE MEDICAL CARE IF YOU HAVE ANY OF THE FOLLOWING SYMPTOMS: severe headache, confusion, chest pain, abdominal pain, vomiting, or shortness of breath.

## 2023-11-20 NOTE — ED PROVIDER NOTE - CARE PLAN
1 Principal Discharge DX:	Benign essential HTN   Principal Discharge DX:	Benign essential HTN  Secondary Diagnosis:	Headache

## 2023-12-12 NOTE — ED ADULT NURSE NOTE - RESPIRATORY WDL
Patient was informed about labs.    Breathing spontaneous and unlabored. Breath sounds clear and equal bilaterally with regular rhythm.

## 2024-11-11 NOTE — ED ADULT NURSE NOTE - FALL HARM RISK TYPE OF ASSESSMENT
Attempted to reach pt again to see how she's feeling for surgery on 11-6-24. Left message for pt to return call to office   
Admission
Not Applicable

## 2025-09-10 ENCOUNTER — EMERGENCY (EMERGENCY)
Facility: HOSPITAL | Age: 40
LOS: 1 days | End: 2025-09-10
Attending: EMERGENCY MEDICINE | Admitting: EMERGENCY MEDICINE
Payer: COMMERCIAL

## 2025-09-10 VITALS
HEIGHT: 67 IN | TEMPERATURE: 99 F | OXYGEN SATURATION: 100 % | HEART RATE: 82 BPM | SYSTOLIC BLOOD PRESSURE: 144 MMHG | DIASTOLIC BLOOD PRESSURE: 75 MMHG | WEIGHT: 266.98 LBS | RESPIRATION RATE: 13 BRPM

## 2025-09-10 VITALS
RESPIRATION RATE: 16 BRPM | SYSTOLIC BLOOD PRESSURE: 149 MMHG | HEART RATE: 84 BPM | OXYGEN SATURATION: 99 % | DIASTOLIC BLOOD PRESSURE: 96 MMHG | TEMPERATURE: 99 F

## 2025-09-10 PROCEDURE — 99283 EMERGENCY DEPT VISIT LOW MDM: CPT | Mod: 25

## 2025-09-10 PROCEDURE — 10081 I&D PILONIDAL CYST COMP: CPT

## 2025-09-10 PROCEDURE — 10080 I&D PILONIDAL CYST SIMPLE: CPT

## 2025-09-10 RX ORDER — ACETAMINOPHEN 500 MG/5ML
650 LIQUID (ML) ORAL ONCE
Refills: 0 | Status: COMPLETED | OUTPATIENT
Start: 2025-09-10 | End: 2025-09-10

## 2025-09-10 RX ORDER — AMOXICILLIN AND CLAVULANATE POTASSIUM 500; 125 MG/1; MG/1
1 TABLET, FILM COATED ORAL ONCE
Refills: 0 | Status: COMPLETED | OUTPATIENT
Start: 2025-09-10 | End: 2025-09-10

## 2025-09-10 RX ORDER — AMOXICILLIN AND CLAVULANATE POTASSIUM 500; 125 MG/1; MG/1
1 TABLET, FILM COATED ORAL
Qty: 20 | Refills: 0
Start: 2025-09-10 | End: 2025-09-19

## 2025-09-10 RX ADMIN — AMOXICILLIN AND CLAVULANATE POTASSIUM 1 TABLET(S): 500; 125 TABLET, FILM COATED ORAL at 20:32

## 2025-09-10 RX ADMIN — Medication 650 MILLIGRAM(S): at 20:32

## 2025-09-12 ENCOUNTER — EMERGENCY (EMERGENCY)
Facility: HOSPITAL | Age: 40
LOS: 1 days | End: 2025-09-12
Attending: STUDENT IN AN ORGANIZED HEALTH CARE EDUCATION/TRAINING PROGRAM | Admitting: STUDENT IN AN ORGANIZED HEALTH CARE EDUCATION/TRAINING PROGRAM
Payer: COMMERCIAL

## 2025-09-12 VITALS
SYSTOLIC BLOOD PRESSURE: 145 MMHG | WEIGHT: 257.94 LBS | HEART RATE: 86 BPM | TEMPERATURE: 99 F | OXYGEN SATURATION: 100 % | DIASTOLIC BLOOD PRESSURE: 99 MMHG | RESPIRATION RATE: 18 BRPM | HEIGHT: 67 IN

## 2025-09-12 PROCEDURE — L9995: CPT
